# Patient Record
Sex: FEMALE | Race: WHITE | Employment: FULL TIME | URBAN - METROPOLITAN AREA
[De-identification: names, ages, dates, MRNs, and addresses within clinical notes are randomized per-mention and may not be internally consistent; named-entity substitution may affect disease eponyms.]

---

## 2017-06-02 ENCOUNTER — HOSPITAL ENCOUNTER (OUTPATIENT)
Dept: OTHER | Age: 37
Discharge: HOME | End: 2017-06-02
Attending: PREVENTIVE MEDICINE | Admitting: PREVENTIVE MEDICINE

## 2017-06-04 LAB
NIL (NEGATIVE) SPOT CONTROL: 0
PANEL A SPOT COUNT: 0
PANEL B SPOT COUNT: 0
POSITIVE CONTROL SPOT COUNT: >20
TB CELL IMMUNE MEASURE: NEGATIVE

## 2017-08-07 ENCOUNTER — HOSPITAL ENCOUNTER (OUTPATIENT)
Dept: MAMMOGRAPHY | Age: 37
Discharge: HOME | End: 2017-08-08

## 2017-08-07 DIAGNOSIS — Z12.31 VISIT FOR SCREENING MAMMOGRAM: ICD-10-CM

## 2017-08-14 ENCOUNTER — HOSPITAL ENCOUNTER (OUTPATIENT)
Dept: ULTRASOUND IMAGING | Age: 37
Discharge: HOME | End: 2017-08-14
Attending: UROLOGY | Admitting: UROLOGY

## 2017-08-14 DIAGNOSIS — R39.15 URGENCY OF URINATION: ICD-10-CM

## 2017-08-28 ENCOUNTER — OFFICE VISIT (OUTPATIENT)
Dept: FAMILY MEDICINE CLINIC | Age: 37
End: 2017-08-28

## 2017-08-28 VITALS
HEIGHT: 63 IN | WEIGHT: 197 LBS | DIASTOLIC BLOOD PRESSURE: 78 MMHG | HEART RATE: 78 BPM | SYSTOLIC BLOOD PRESSURE: 142 MMHG | BODY MASS INDEX: 34.91 KG/M2 | OXYGEN SATURATION: 98 %

## 2017-08-28 DIAGNOSIS — Z23 NEED FOR INFLUENZA VACCINATION: ICD-10-CM

## 2017-08-28 DIAGNOSIS — Z23 NEED FOR PROPHYLACTIC VACCINATION AGAINST DIPHTHERIA-TETANUS-PERTUSSIS (DTP): ICD-10-CM

## 2017-08-28 DIAGNOSIS — I10 ESSENTIAL HYPERTENSION: Primary | ICD-10-CM

## 2017-08-28 DIAGNOSIS — E55.9 VITAMIN D DEFICIENCY: ICD-10-CM

## 2017-08-28 DIAGNOSIS — R63.5 WEIGHT GAIN: ICD-10-CM

## 2017-08-28 DIAGNOSIS — Z51.81 MEDICATION MONITORING ENCOUNTER: ICD-10-CM

## 2017-08-28 DIAGNOSIS — G43.109 MIGRAINE EQUIVALENT SYNDROME: ICD-10-CM

## 2017-08-28 PROCEDURE — 90686 IIV4 VACC NO PRSV 0.5 ML IM: CPT | Performed by: FAMILY MEDICINE

## 2017-08-28 PROCEDURE — 90715 TDAP VACCINE 7 YRS/> IM: CPT | Performed by: FAMILY MEDICINE

## 2017-08-28 PROCEDURE — 36415 COLL VENOUS BLD VENIPUNCTURE: CPT | Performed by: FAMILY MEDICINE

## 2017-08-28 PROCEDURE — 90471 IMMUNIZATION ADMIN: CPT | Performed by: FAMILY MEDICINE

## 2017-08-28 PROCEDURE — 90472 IMMUNIZATION ADMIN EACH ADD: CPT | Performed by: FAMILY MEDICINE

## 2017-08-28 PROCEDURE — 99214 OFFICE O/P EST MOD 30 MIN: CPT | Performed by: FAMILY MEDICINE

## 2017-08-28 RX ORDER — PHENTERMINE HYDROCHLORIDE 37.5 MG/1
37.5 CAPSULE ORAL EVERY MORNING
Qty: 30 CAPSULE | Refills: 0 | Status: SHIPPED | OUTPATIENT
Start: 2017-08-28 | End: 2017-09-25 | Stop reason: SDUPTHER

## 2017-08-28 RX ORDER — LORAZEPAM 0.5 MG/1
0.5 TABLET ORAL PRN
COMMUNITY
End: 2019-11-07 | Stop reason: SDUPTHER

## 2017-08-28 RX ORDER — SUMATRIPTAN 100 MG/1
100 TABLET, FILM COATED ORAL PRN
Qty: 9 TABLET | Refills: 5 | Status: SHIPPED | OUTPATIENT
Start: 2017-08-28 | End: 2019-03-12 | Stop reason: SDUPTHER

## 2017-08-28 RX ORDER — PROPRANOLOL HYDROCHLORIDE 60 MG/1
60 TABLET ORAL DAILY
COMMUNITY
End: 2017-09-25 | Stop reason: SDUPTHER

## 2017-08-28 RX ORDER — BUTALBITAL, ACETAMINOPHEN AND CAFFEINE 300; 40; 50 MG/1; MG/1; MG/1
1 CAPSULE ORAL PRN
COMMUNITY
End: 2017-08-28 | Stop reason: SDUPTHER

## 2017-08-28 RX ORDER — SUMATRIPTAN 100 MG/1
100 TABLET, FILM COATED ORAL PRN
COMMUNITY
End: 2017-08-28 | Stop reason: SDUPTHER

## 2017-08-28 RX ORDER — BUTALBITAL, ACETAMINOPHEN AND CAFFEINE 300; 40; 50 MG/1; MG/1; MG/1
1 CAPSULE ORAL PRN
Qty: 30 CAPSULE | Refills: 0 | Status: SHIPPED | OUTPATIENT
Start: 2017-08-28 | End: 2017-08-30 | Stop reason: SDUPTHER

## 2017-08-28 ASSESSMENT — PATIENT HEALTH QUESTIONNAIRE - PHQ9
SUM OF ALL RESPONSES TO PHQ QUESTIONS 1-9: 0
1. LITTLE INTEREST OR PLEASURE IN DOING THINGS: 0
2. FEELING DOWN, DEPRESSED OR HOPELESS: 0
SUM OF ALL RESPONSES TO PHQ9 QUESTIONS 1 & 2: 0

## 2017-08-28 ASSESSMENT — ENCOUNTER SYMPTOMS
BLURRED VISION: 0
ABDOMINAL PAIN: 0
COUGH: 0

## 2017-08-29 LAB
A/G RATIO: 1.8 (ref 1.1–2.2)
ALBUMIN SERPL-MCNC: 4.7 G/DL (ref 3.4–5)
ALP BLD-CCNC: 100 U/L (ref 40–129)
ALT SERPL-CCNC: 25 U/L (ref 10–40)
ANION GAP SERPL CALCULATED.3IONS-SCNC: 17 MMOL/L (ref 3–16)
AST SERPL-CCNC: 18 U/L (ref 15–37)
BILIRUB SERPL-MCNC: 0.4 MG/DL (ref 0–1)
BUN BLDV-MCNC: 15 MG/DL (ref 7–20)
CALCIUM SERPL-MCNC: 9.7 MG/DL (ref 8.3–10.6)
CHLORIDE BLD-SCNC: 102 MMOL/L (ref 99–110)
CHOLESTEROL, FASTING: 201 MG/DL (ref 0–199)
CO2: 22 MMOL/L (ref 21–32)
CREAT SERPL-MCNC: 0.7 MG/DL (ref 0.6–1.1)
GFR AFRICAN AMERICAN: >60
GFR NON-AFRICAN AMERICAN: >60
GLOBULIN: 2.6 G/DL
GLUCOSE FASTING: 91 MG/DL (ref 70–99)
HCT VFR BLD CALC: 39.8 % (ref 36–48)
HDLC SERPL-MCNC: 48 MG/DL (ref 40–60)
HEMOGLOBIN: 13.7 G/DL (ref 12–16)
LDL CHOLESTEROL CALCULATED: 124 MG/DL
MCH RBC QN AUTO: 29.7 PG (ref 26–34)
MCHC RBC AUTO-ENTMCNC: 34.3 G/DL (ref 31–36)
MCV RBC AUTO: 86.6 FL (ref 80–100)
PDW BLD-RTO: 13 % (ref 12.4–15.4)
PLATELET # BLD: 376 K/UL (ref 135–450)
PMV BLD AUTO: 8.4 FL (ref 5–10.5)
POTASSIUM SERPL-SCNC: 3.9 MMOL/L (ref 3.5–5.1)
RBC # BLD: 4.6 M/UL (ref 4–5.2)
SODIUM BLD-SCNC: 141 MMOL/L (ref 136–145)
TOTAL PROTEIN: 7.3 G/DL (ref 6.4–8.2)
TRIGLYCERIDE, FASTING: 147 MG/DL (ref 0–150)
TSH SERPL DL<=0.05 MIU/L-ACNC: 1.29 UIU/ML (ref 0.27–4.2)
VITAMIN D 25-HYDROXY: 34.7 NG/ML
VLDLC SERPL CALC-MCNC: 29 MG/DL
WBC # BLD: 9.2 K/UL (ref 4–11)

## 2017-08-30 ENCOUNTER — TELEPHONE (OUTPATIENT)
Dept: FAMILY MEDICINE CLINIC | Age: 37
End: 2017-08-30

## 2017-08-30 DIAGNOSIS — G43.109 MIGRAINE EQUIVALENT SYNDROME: ICD-10-CM

## 2017-08-30 RX ORDER — BUTALBITAL, ACETAMINOPHEN AND CAFFEINE 300; 40; 50 MG/1; MG/1; MG/1
1 CAPSULE ORAL EVERY 6 HOURS PRN
Qty: 30 CAPSULE | Refills: 0 | Status: SHIPPED | OUTPATIENT
Start: 2017-08-30 | End: 2018-07-11

## 2017-09-25 ENCOUNTER — OFFICE VISIT (OUTPATIENT)
Dept: FAMILY MEDICINE CLINIC | Age: 37
End: 2017-09-25

## 2017-09-25 VITALS
HEART RATE: 88 BPM | BODY MASS INDEX: 33.66 KG/M2 | WEIGHT: 190 LBS | DIASTOLIC BLOOD PRESSURE: 86 MMHG | HEIGHT: 63 IN | OXYGEN SATURATION: 98 % | SYSTOLIC BLOOD PRESSURE: 116 MMHG

## 2017-09-25 DIAGNOSIS — R63.5 WEIGHT GAIN: ICD-10-CM

## 2017-09-25 DIAGNOSIS — I10 ESSENTIAL HYPERTENSION: Primary | ICD-10-CM

## 2017-09-25 PROCEDURE — 99213 OFFICE O/P EST LOW 20 MIN: CPT | Performed by: FAMILY MEDICINE

## 2017-09-25 RX ORDER — PHENTERMINE HYDROCHLORIDE 37.5 MG/1
37.5 CAPSULE ORAL EVERY MORNING
Qty: 30 CAPSULE | Refills: 0 | Status: SHIPPED | OUTPATIENT
Start: 2017-09-25 | End: 2017-10-23 | Stop reason: SDUPTHER

## 2017-09-25 RX ORDER — PROPRANOLOL HYDROCHLORIDE 60 MG/1
60 TABLET ORAL DAILY
Qty: 30 TABLET | Refills: 5 | Status: SHIPPED | OUTPATIENT
Start: 2017-09-25 | End: 2018-04-23 | Stop reason: SDUPTHER

## 2017-09-26 ASSESSMENT — ENCOUNTER SYMPTOMS
COUGH: 0
ABDOMINAL PAIN: 0
BLURRED VISION: 0

## 2017-10-23 ENCOUNTER — OFFICE VISIT (OUTPATIENT)
Dept: FAMILY MEDICINE CLINIC | Age: 37
End: 2017-10-23

## 2017-10-23 VITALS
BODY MASS INDEX: 32.89 KG/M2 | HEIGHT: 63 IN | HEART RATE: 118 BPM | SYSTOLIC BLOOD PRESSURE: 142 MMHG | WEIGHT: 185.6 LBS | DIASTOLIC BLOOD PRESSURE: 94 MMHG | OXYGEN SATURATION: 98 %

## 2017-10-23 DIAGNOSIS — R63.5 WEIGHT GAIN: ICD-10-CM

## 2017-10-23 DIAGNOSIS — G43.109 MIGRAINE EQUIVALENT SYNDROME: Primary | ICD-10-CM

## 2017-10-23 DIAGNOSIS — I10 ESSENTIAL HYPERTENSION: ICD-10-CM

## 2017-10-23 PROCEDURE — 99213 OFFICE O/P EST LOW 20 MIN: CPT | Performed by: FAMILY MEDICINE

## 2017-10-23 PROCEDURE — G8417 CALC BMI ABV UP PARAM F/U: HCPCS | Performed by: FAMILY MEDICINE

## 2017-10-23 PROCEDURE — G8484 FLU IMMUNIZE NO ADMIN: HCPCS | Performed by: FAMILY MEDICINE

## 2017-10-23 PROCEDURE — 1036F TOBACCO NON-USER: CPT | Performed by: FAMILY MEDICINE

## 2017-10-23 PROCEDURE — G8427 DOCREV CUR MEDS BY ELIG CLIN: HCPCS | Performed by: FAMILY MEDICINE

## 2017-10-23 RX ORDER — PHENTERMINE HYDROCHLORIDE 37.5 MG/1
37.5 CAPSULE ORAL EVERY MORNING
Qty: 30 CAPSULE | Refills: 0 | Status: SHIPPED | OUTPATIENT
Start: 2017-10-23 | End: 2017-11-22

## 2017-10-23 NOTE — PROGRESS NOTES
Chief Complaint   Patient presents with    Weight Loss     Patient is here for a recheck on weight    Hypertension     Patient is here for a recheck on hypertension        Patient is established unless otherwise noted. Above chief complaint and [place] obtained by this physician provider. Review of Systems   Constitutional: Positive for weight loss. Negative for fever and malaise/fatigue. HENT: Negative for congestion. Eyes: Negative for blurred vision. Respiratory: Negative for cough. Cardiovascular: Negative for chest pain and leg swelling. Gastrointestinal: Negative for abdominal pain. Musculoskeletal: Negative for myalgias. Skin: Negative for rash. Neurological: Negative for headaches. Psychiatric/Behavioral: Negative for depression. The patient is not nervous/anxious. Allergies   Allergen Reactions    Vicodin [Hydrocodone-Acetaminophen]      Makes patient nauseated.  Pcn [Penicillins] Other (See Comments)     Family allergy, never given    Sulfa Antibiotics Other (See Comments)     Family allergy, never given     Allergy history updated.     Outpatient Prescriptions Marked as Taking for the 10/23/17 encounter (Office Visit) with Will Mcintosh MD   Medication Sig Dispense Refill    phentermine (ADIPEX-P) 37.5 MG capsule Take 1 capsule by mouth every morning 30 capsule 0    propranolol (INDERAL) 60 MG tablet Take 1 tablet by mouth daily 30 tablet 5    butalbital-APAP-caffeine (FIORICET) -40 MG CAPS per capsule Take 1 capsule by mouth every 6 hours as needed for Headaches 30 capsule 0    LORazepam (ATIVAN) 0.5 MG tablet Take 0.5 mg by mouth as needed for Anxiety      SUMAtriptan (IMITREX) 100 MG tablet Take 1 tablet by mouth as needed for Migraine 9 tablet 5    Cholecalciferol (VITAMIN D3) 2000 UNITS CAPS Take 2 capsules by mouth      Coenzyme Q10 (CO Q 10) 100 MG CAPS Take by mouth      magnesium 30 MG tablet Take 30 mg by mouth 2 times daily      b complex

## 2017-10-29 ASSESSMENT — ENCOUNTER SYMPTOMS
COUGH: 0
ABDOMINAL PAIN: 0
BLURRED VISION: 0

## 2017-11-30 ENCOUNTER — TELEPHONE (OUTPATIENT)
Dept: FAMILY MEDICINE CLINIC | Age: 37
End: 2017-11-30

## 2017-11-30 DIAGNOSIS — L98.9 SKIN PROBLEM: Primary | ICD-10-CM

## 2017-11-30 NOTE — TELEPHONE ENCOUNTER
Patient called and would like a referral to Dermatology, states she has a spot on the bottom of her chin that hasnt went away. It has been visible since may. Please Advise. Will see Derm downstairs. Please Advise.

## 2018-02-15 ENCOUNTER — OFFICE VISIT (OUTPATIENT)
Dept: FAMILY MEDICINE CLINIC | Age: 38
End: 2018-02-15

## 2018-02-15 ENCOUNTER — HOSPITAL ENCOUNTER (OUTPATIENT)
Dept: GENERAL RADIOLOGY | Age: 38
Discharge: HOME | End: 2018-02-15
Attending: NURSE PRACTITIONER | Admitting: NURSE PRACTITIONER

## 2018-02-15 VITALS
HEART RATE: 88 BPM | HEIGHT: 63 IN | BODY MASS INDEX: 34.05 KG/M2 | TEMPERATURE: 98.4 F | OXYGEN SATURATION: 98 % | WEIGHT: 192.2 LBS | DIASTOLIC BLOOD PRESSURE: 74 MMHG | SYSTOLIC BLOOD PRESSURE: 102 MMHG

## 2018-02-15 DIAGNOSIS — R05.9 COUGH: ICD-10-CM

## 2018-02-15 DIAGNOSIS — R68.89 FLU-LIKE SYMPTOMS: Primary | ICD-10-CM

## 2018-02-15 LAB
INFLUENZA A ANTIBODY: NEGATIVE
INFLUENZA B ANTIBODY: NEGATIVE

## 2018-02-15 PROCEDURE — 99213 OFFICE O/P EST LOW 20 MIN: CPT | Performed by: NURSE PRACTITIONER

## 2018-02-15 PROCEDURE — 87804 INFLUENZA ASSAY W/OPTIC: CPT | Performed by: NURSE PRACTITIONER

## 2018-02-15 RX ORDER — GUAIFENESIN 600 MG/1
600 TABLET, EXTENDED RELEASE ORAL 2 TIMES DAILY
Qty: 30 TABLET | Refills: 0 | Status: SHIPPED | OUTPATIENT
Start: 2018-02-15 | End: 2019-07-17

## 2018-02-15 RX ORDER — BENZONATATE 100 MG/1
100 CAPSULE ORAL 3 TIMES DAILY PRN
Qty: 21 CAPSULE | Refills: 0 | Status: SHIPPED | OUTPATIENT
Start: 2018-02-15 | End: 2018-02-22

## 2018-02-15 RX ORDER — OSELTAMIVIR PHOSPHATE 75 MG/1
75 CAPSULE ORAL 2 TIMES DAILY
Qty: 10 CAPSULE | Refills: 0 | Status: SHIPPED | OUTPATIENT
Start: 2018-02-15 | End: 2018-02-20

## 2018-02-15 ASSESSMENT — ENCOUNTER SYMPTOMS
VOMITING: 0
FLU SYMPTOMS: 1
DIARRHEA: 0
SWOLLEN GLANDS: 1
SINUS PRESSURE: 1
SHORTNESS OF BREATH: 1
NAUSEA: 0
RHINORRHEA: 1
COUGH: 1
EYES NEGATIVE: 1
CHEST TIGHTNESS: 1

## 2018-02-15 NOTE — PATIENT INSTRUCTIONS
We are committed to providing you the best care possible. If you receive a survey after visiting one of our offices, please take time to share your experience concerning your physician office visit. These surveys are confidential and no health information about you is shared. We are eager to improve for you and we are counting on your feedback to help make that happen. Patient Education        Cough: Care Instructions  Your Care Instructions    A cough is your body's response to something that bothers your throat or airways. Many things can cause a cough. You might cough because of a cold or the flu, bronchitis, or asthma. Smoking, postnasal drip, allergies, and stomach acid that backs up into your throat also can cause coughs. A cough is a symptom, not a disease. Most coughs stop when the cause, such as a cold, goes away. You can take a few steps at home to cough less and feel better. Follow-up care is a key part of your treatment and safety. Be sure to make and go to all appointments, and call your doctor if you are having problems. It's also a good idea to know your test results and keep a list of the medicines you take. How can you care for yourself at home? · Drink lots of water and other fluids. This helps thin the mucus and soothes a dry or sore throat. Honey or lemon juice in hot water or tea may ease a dry cough. · Take cough medicine as directed by your doctor. · Prop up your head on pillows to help you breathe and ease a dry cough. · Try cough drops to soothe a dry or sore throat. Cough drops don't stop a cough. Medicine-flavored cough drops are no better than candy-flavored drops or hard candy. · Do not smoke. Avoid secondhand smoke. If you need help quitting, talk to your doctor about stop-smoking programs and medicines. These can increase your chances of quitting for good. When should you call for help? Call 911 anytime you think you may need emergency care.  For example, call information.

## 2018-02-15 NOTE — PROGRESS NOTES
capsules by mouth      Coenzyme Q10 (CO Q 10) 100 MG CAPS Take by mouth daily       magnesium 30 MG tablet Take 30 mg by mouth 2 times daily      b complex vitamins capsule Take 1 capsule by mouth daily       No current facility-administered medications on file prior to visit. Past Medical, Family, and Social History have been reviewed today. Review of Systems   Constitutional: Positive for activity change, appetite change (increased), chills, diaphoresis and fatigue. Fever: uncertain. HENT: Positive for congestion, rhinorrhea and sinus pressure (about 1 week). Eyes: Negative. Respiratory: Positive for cough, chest tightness and shortness of breath. Cardiovascular: Negative for chest pain and palpitations. Gastrointestinal: Negative for diarrhea, nausea and vomiting. Neurological: Positive for headaches (baseline degree). Negative for dizziness and light-headedness. OBJECTIVE:     /74   Pulse 88   Temp 98.4 °F (36.9 °C) (Oral)   Ht 5' 2.5\" (1.588 m)   Wt 192 lb 3.2 oz (87.2 kg)   SpO2 98%   BMI 34.59 kg/m²     Physical Exam   Constitutional: She is oriented to person, place, and time. She appears well-developed and well-nourished. HENT:   Head: Normocephalic. Right Ear: Tympanic membrane, external ear and ear canal normal.   Left Ear: Tympanic membrane, external ear and ear canal normal.   Nose: Nose normal. Right sinus exhibits no maxillary sinus tenderness and no frontal sinus tenderness. Left sinus exhibits no maxillary sinus tenderness and no frontal sinus tenderness. Mouth/Throat: Uvula is midline, oropharynx is clear and moist and mucous membranes are normal.   Eyes: Conjunctivae are normal. Pupils are equal, round, and reactive to light. Neck: Neck supple. Cardiovascular: Normal rate and regular rhythm. Pulmonary/Chest: Effort normal. No respiratory distress. She has no wheezes. She has no rales.    Rhonchi w/expiration to RIGOBERTO   Lymphadenopathy: guaiFENesin (MUCINEX) 600 MG extended release tablet; Take 1 tablet by mouth 2 times daily  Dispense: 30 tablet; Refill: 0  - benzonatate (TESSALON PERLES) 100 MG capsule; Take 1 capsule by mouth 3 times daily as needed for Cough  Dispense: 21 capsule; Refill: 0      Return if symptoms worsen or fail to improve. Care discussed with patient. Questions answered and patient verbalizes understanding and agrees with plan. Medications reviewed and reconciled. Continue current medications. Appropriate prescriptions are ordered. Risks and benefits of meds are discussed. After visit summary provided. Advised to call for any problems, questions, or concerns. Patient educated on signs and symptoms of exacerbation and when to seek further medical attention. Patient verbalized understanding of these signs and symptoms as well as the plan of care. Patient will be contacted in two to three days to check on progress.

## 2018-04-06 ENCOUNTER — PATIENT MESSAGE (OUTPATIENT)
Dept: FAMILY MEDICINE CLINIC | Age: 38
End: 2018-04-06

## 2018-04-06 DIAGNOSIS — R61 NIGHT SWEATS: Primary | ICD-10-CM

## 2018-04-09 RX ORDER — CLONIDINE HYDROCHLORIDE 0.1 MG/1
0.1 TABLET ORAL NIGHTLY
Qty: 30 TABLET | Refills: 2 | Status: SHIPPED | OUTPATIENT
Start: 2018-04-09 | End: 2019-01-16

## 2018-04-23 ENCOUNTER — OFFICE VISIT (OUTPATIENT)
Dept: FAMILY MEDICINE CLINIC | Age: 38
End: 2018-04-23

## 2018-04-23 VITALS
BODY MASS INDEX: 35.3 KG/M2 | DIASTOLIC BLOOD PRESSURE: 82 MMHG | HEART RATE: 75 BPM | HEIGHT: 63 IN | OXYGEN SATURATION: 99 % | SYSTOLIC BLOOD PRESSURE: 128 MMHG | WEIGHT: 199.2 LBS

## 2018-04-23 DIAGNOSIS — I10 ESSENTIAL HYPERTENSION: Primary | ICD-10-CM

## 2018-04-23 DIAGNOSIS — R73.9 HYPERGLYCEMIA: ICD-10-CM

## 2018-04-23 DIAGNOSIS — G43.109 MIGRAINE EQUIVALENT SYNDROME: ICD-10-CM

## 2018-04-23 DIAGNOSIS — E66.9 OBESITY (BMI 35.0-39.9 WITHOUT COMORBIDITY): ICD-10-CM

## 2018-04-23 LAB
A/G RATIO: 1.9 (ref 1.1–2.2)
ALBUMIN SERPL-MCNC: 4.7 G/DL (ref 3.4–5)
ALP BLD-CCNC: 99 U/L (ref 40–129)
ALT SERPL-CCNC: 20 U/L (ref 10–40)
ANION GAP SERPL CALCULATED.3IONS-SCNC: 20 MMOL/L (ref 3–16)
AST SERPL-CCNC: 16 U/L (ref 15–37)
BILIRUB SERPL-MCNC: 0.3 MG/DL (ref 0–1)
BUN BLDV-MCNC: 16 MG/DL (ref 7–20)
CALCIUM SERPL-MCNC: 9.7 MG/DL (ref 8.3–10.6)
CHLORIDE BLD-SCNC: 103 MMOL/L (ref 99–110)
CHOLESTEROL, FASTING: 195 MG/DL (ref 0–199)
CO2: 23 MMOL/L (ref 21–32)
CREAT SERPL-MCNC: 0.7 MG/DL (ref 0.6–1.1)
GFR AFRICAN AMERICAN: >60
GFR NON-AFRICAN AMERICAN: >60
GLOBULIN: 2.5 G/DL
GLUCOSE FASTING: 96 MG/DL (ref 70–99)
HDLC SERPL-MCNC: 52 MG/DL (ref 40–60)
LDL CHOLESTEROL CALCULATED: 115 MG/DL
POTASSIUM SERPL-SCNC: 4.3 MMOL/L (ref 3.5–5.1)
SODIUM BLD-SCNC: 146 MMOL/L (ref 136–145)
TOTAL PROTEIN: 7.2 G/DL (ref 6.4–8.2)
TRIGLYCERIDE, FASTING: 142 MG/DL (ref 0–150)
VLDLC SERPL CALC-MCNC: 28 MG/DL

## 2018-04-23 PROCEDURE — 36415 COLL VENOUS BLD VENIPUNCTURE: CPT | Performed by: FAMILY MEDICINE

## 2018-04-23 PROCEDURE — 99214 OFFICE O/P EST MOD 30 MIN: CPT | Performed by: FAMILY MEDICINE

## 2018-04-23 RX ORDER — PROPRANOLOL HYDROCHLORIDE 60 MG/1
60 TABLET ORAL DAILY
Qty: 30 TABLET | Refills: 12 | Status: SHIPPED | OUTPATIENT
Start: 2018-04-23 | End: 2019-03-12

## 2018-04-23 RX ORDER — OMEPRAZOLE 20 MG/1
20 CAPSULE, DELAYED RELEASE ORAL DAILY
COMMUNITY
End: 2019-07-16 | Stop reason: SDUPTHER

## 2018-04-23 RX ORDER — MULTIVITAMIN WITH IRON
250 TABLET ORAL DAILY
COMMUNITY
End: 2019-03-12 | Stop reason: ALTCHOICE

## 2018-04-23 RX ORDER — GARLIC 180 MG
1 TABLET, DELAYED RELEASE (ENTERIC COATED) ORAL DAILY
COMMUNITY
End: 2022-10-12

## 2018-04-23 ASSESSMENT — ENCOUNTER SYMPTOMS
ABDOMINAL PAIN: 0
BLURRED VISION: 0
COUGH: 0

## 2018-04-24 LAB
ESTIMATED AVERAGE GLUCOSE: 119.8 MG/DL
HBA1C MFR BLD: 5.8 %

## 2018-05-02 ENCOUNTER — OFFICE VISIT (OUTPATIENT)
Dept: BARIATRICS/WEIGHT MGMT | Age: 38
End: 2018-05-02

## 2018-05-02 VITALS
BODY MASS INDEX: 36.11 KG/M2 | HEIGHT: 63 IN | DIASTOLIC BLOOD PRESSURE: 58 MMHG | SYSTOLIC BLOOD PRESSURE: 128 MMHG | WEIGHT: 203.8 LBS | HEART RATE: 67 BPM

## 2018-05-02 DIAGNOSIS — E66.9 OBESITY (BMI 35.0-39.9 WITHOUT COMORBIDITY): Primary | ICD-10-CM

## 2018-05-02 PROCEDURE — 99999 PR OFFICE/OUTPT VISIT,PROCEDURE ONLY: CPT

## 2018-05-11 ENCOUNTER — OFFICE VISIT (OUTPATIENT)
Dept: BARIATRICS/WEIGHT MGMT | Age: 38
End: 2018-05-11

## 2018-05-11 VITALS
SYSTOLIC BLOOD PRESSURE: 133 MMHG | DIASTOLIC BLOOD PRESSURE: 81 MMHG | HEART RATE: 75 BPM | BODY MASS INDEX: 35.45 KG/M2 | WEIGHT: 200.1 LBS | HEIGHT: 63 IN

## 2018-05-11 PROCEDURE — 99999 PR OFFICE/OUTPT VISIT,PROCEDURE ONLY: CPT

## 2018-05-18 ENCOUNTER — OFFICE VISIT (OUTPATIENT)
Dept: BARIATRICS/WEIGHT MGMT | Age: 38
End: 2018-05-18

## 2018-05-18 VITALS
WEIGHT: 197.6 LBS | DIASTOLIC BLOOD PRESSURE: 84 MMHG | BODY MASS INDEX: 35.01 KG/M2 | SYSTOLIC BLOOD PRESSURE: 130 MMHG | HEART RATE: 80 BPM | HEIGHT: 63 IN

## 2018-05-18 DIAGNOSIS — E66.9 OBESITY (BMI 35.0-39.9 WITHOUT COMORBIDITY): Primary | ICD-10-CM

## 2018-05-18 PROCEDURE — 99999 PR OFFICE/OUTPT VISIT,PROCEDURE ONLY: CPT

## 2018-05-23 ENCOUNTER — OFFICE VISIT (OUTPATIENT)
Dept: BARIATRICS/WEIGHT MGMT | Age: 38
End: 2018-05-23

## 2018-05-23 VITALS
HEART RATE: 70 BPM | SYSTOLIC BLOOD PRESSURE: 114 MMHG | HEIGHT: 63 IN | DIASTOLIC BLOOD PRESSURE: 66 MMHG | WEIGHT: 197.6 LBS | BODY MASS INDEX: 35.01 KG/M2

## 2018-05-23 DIAGNOSIS — E66.9 OBESITY (BMI 35.0-39.9 WITHOUT COMORBIDITY): Primary | ICD-10-CM

## 2018-05-23 PROCEDURE — 99999 PR OFFICE/OUTPT VISIT,PROCEDURE ONLY: CPT

## 2018-06-01 ENCOUNTER — OFFICE VISIT (OUTPATIENT)
Dept: BARIATRICS/WEIGHT MGMT | Age: 38
End: 2018-06-01

## 2018-06-01 VITALS
WEIGHT: 196.8 LBS | HEIGHT: 63 IN | SYSTOLIC BLOOD PRESSURE: 121 MMHG | DIASTOLIC BLOOD PRESSURE: 61 MMHG | BODY MASS INDEX: 34.87 KG/M2 | HEART RATE: 69 BPM

## 2018-06-01 DIAGNOSIS — E66.9 OBESITY (BMI 30.0-34.9): Primary | ICD-10-CM

## 2018-06-01 PROCEDURE — 99999 PR OFFICE/OUTPT VISIT,PROCEDURE ONLY: CPT

## 2018-06-15 ENCOUNTER — OFFICE VISIT (OUTPATIENT)
Dept: FAMILY MEDICINE CLINIC | Age: 38
End: 2018-06-15

## 2018-06-15 VITALS
WEIGHT: 198.8 LBS | HEART RATE: 76 BPM | OXYGEN SATURATION: 98 % | BODY MASS INDEX: 35.22 KG/M2 | SYSTOLIC BLOOD PRESSURE: 134 MMHG | DIASTOLIC BLOOD PRESSURE: 98 MMHG

## 2018-06-15 DIAGNOSIS — G56.01 CARPAL TUNNEL SYNDROME OF RIGHT WRIST: Primary | ICD-10-CM

## 2018-06-15 PROCEDURE — 99213 OFFICE O/P EST LOW 20 MIN: CPT | Performed by: FAMILY MEDICINE

## 2018-06-29 ENCOUNTER — OFFICE VISIT (OUTPATIENT)
Dept: BARIATRICS/WEIGHT MGMT | Age: 38
End: 2018-06-29

## 2018-06-29 VITALS
WEIGHT: 196.6 LBS | HEART RATE: 65 BPM | HEIGHT: 63 IN | SYSTOLIC BLOOD PRESSURE: 137 MMHG | BODY MASS INDEX: 34.84 KG/M2 | DIASTOLIC BLOOD PRESSURE: 74 MMHG

## 2018-06-29 DIAGNOSIS — E66.9 OBESITY (BMI 30.0-34.9): Primary | ICD-10-CM

## 2018-06-29 PROCEDURE — 99999 PR OFFICE/OUTPT VISIT,PROCEDURE ONLY: CPT

## 2018-06-29 ASSESSMENT — ENCOUNTER SYMPTOMS
COUGH: 0
ABDOMINAL PAIN: 0
BLURRED VISION: 0

## 2018-07-10 DIAGNOSIS — G43.109 MIGRAINE EQUIVALENT SYNDROME: ICD-10-CM

## 2018-07-11 RX ORDER — BUTALBITAL, ACETAMINOPHEN AND CAFFEINE 300; 40; 50 MG/1; MG/1; MG/1
CAPSULE ORAL
Qty: 30 CAPSULE | Refills: 0 | Status: SHIPPED | OUTPATIENT
Start: 2018-07-11 | End: 2019-10-31 | Stop reason: SDUPTHER

## 2018-07-23 ENCOUNTER — OFFICE VISIT (OUTPATIENT)
Dept: BARIATRICS/WEIGHT MGMT | Age: 38
End: 2018-07-23

## 2018-07-23 VITALS
DIASTOLIC BLOOD PRESSURE: 77 MMHG | HEART RATE: 58 BPM | BODY MASS INDEX: 34.91 KG/M2 | HEIGHT: 63 IN | SYSTOLIC BLOOD PRESSURE: 127 MMHG | WEIGHT: 197 LBS

## 2018-07-23 DIAGNOSIS — E66.9 OBESITY (BMI 30.0-34.9): Primary | ICD-10-CM

## 2018-07-23 PROCEDURE — 99999 PR OFFICE/OUTPT VISIT,PROCEDURE ONLY: CPT

## 2018-07-23 NOTE — PROGRESS NOTES
Outpatient Nutrition Counseling - Non-Surgical Weight Loss Program    REASON FOR VISIT: Week 9 Weigh-In    Chief Complaint:    Chief Complaint   Patient presents with    Weight Management       SUBJECTIVE:    The patient is a 34 y.o. female being seen for obesity, enrolled in Non-Surgical Weight Loss Program; [place], Height: 5' 3\" (160 cm), Weight: 197 lb (89.4 kg), Current Body mass index is 34.9 kg/m². The patient's PCP is Mariano Gutierrez MD     Comorbid Conditions:  Significant diseases affecting this patient are   Past Medical History:   Diagnosis Date    Hypertension     Migraines    . Review of Systems - ROS  Otherwise per HPI. Allergies: Allergies   Allergen Reactions    Vicodin [Hydrocodone-Acetaminophen]      Makes patient nauseated.  Pcn [Penicillins] Other (See Comments)     Family allergy, never given    Sulfa Antibiotics Other (See Comments)     Family allergy, never given       Past Surgical History:  Past Surgical History:   Procedure Laterality Date    CHOLECYSTECTOMY  20014    HERNIA REPAIR      HYSTERECTOMY      NASAL SINUS SURGERY  1991       Family History:  No family history on file. Social History:  Social History     Social History    Marital status:      Spouse name: N/A    Number of children: N/A    Years of education: N/A     Occupational History    Not on file.      Social History Main Topics    Smoking status: Former Smoker     Types: Cigarettes     Quit date: 2009    Smokeless tobacco: Never Used    Alcohol use 0.0 oz/week      Comment: on occasion    Drug use: No    Sexual activity: Not on file     Other Topics Concern    Not on file     Social History Narrative    No narrative on file         OBJECTIVE:  Physical Exam   /77 (Site: Right Arm, Position: Sitting, Cuff Size: Large Adult)   Pulse 58   Ht 5' 3\" (1.6 m)   Wt 197 lb (89.4 kg)   BMI 34.90 kg/m²      Weight Loss: +0.4 lbs this week, -6.8 lbs overall    NUTRITION DIAGNOSIS:

## 2018-08-06 ENCOUNTER — OFFICE VISIT (OUTPATIENT)
Dept: BARIATRICS/WEIGHT MGMT | Age: 38
End: 2018-08-06

## 2018-08-06 VITALS
BODY MASS INDEX: 34.85 KG/M2 | WEIGHT: 196.7 LBS | HEIGHT: 63 IN | DIASTOLIC BLOOD PRESSURE: 62 MMHG | SYSTOLIC BLOOD PRESSURE: 139 MMHG | HEART RATE: 73 BPM

## 2018-08-06 VITALS
DIASTOLIC BLOOD PRESSURE: 62 MMHG | HEART RATE: 73 BPM | WEIGHT: 196.7 LBS | SYSTOLIC BLOOD PRESSURE: 139 MMHG | HEIGHT: 63 IN | BODY MASS INDEX: 34.85 KG/M2

## 2018-08-06 DIAGNOSIS — E66.9 OBESITY (BMI 30.0-34.9): Primary | ICD-10-CM

## 2018-08-06 PROCEDURE — 99999 PR OFFICE/OUTPT VISIT,PROCEDURE ONLY: CPT

## 2018-08-06 NOTE — PROGRESS NOTES
tobacco: Never Used    Alcohol use 0.0 oz/week      Comment: on occasion    Drug use: No    Sexual activity: Not on file     Other Topics Concern    Not on file     Social History Narrative    No narrative on file         OBJECTIVE:  Physical Exam   /62 (Site: Right Arm, Position: Sitting, Cuff Size: Large Adult)   Pulse 73   Ht 5' 3\" (1.6 m)   Wt 196 lb 11.2 oz (89.2 kg)   BMI 34.84 kg/m²      Weight Loss: -7.1 lbs overall    NUTRITION DIAGNOSIS: Overweight / Obesity   Problem: Increased adiposity compared to reference standard or established norm   Etiology: Excess intake compared to output over time   S/S: ht: 63\" Wt: 196.7 lbs BMI: 34.8    NUTRITION INTERVENTIONS:    Individualized treatment goals to address nutrition diagnosis:   Instructed on calorie counting - 1200 kcals daily   Provided sample menus, and food lists   Encouraged Physical activity as approved by physician    MONITORING/ EVALUATION/ PLAN:   Pt verbalized understanding of all materials covered   Pt asked pertinent questions throughout the session - expect compliance with nutrition guidelines presented   Provided pt with contact information should questions arise prior to next visit   Will f/u with pt weekly  Micah Funes MS, RDN, LD  8/6/2018

## 2018-08-13 ENCOUNTER — OFFICE VISIT (OUTPATIENT)
Dept: BARIATRICS/WEIGHT MGMT | Age: 38
End: 2018-08-13

## 2018-08-13 VITALS
WEIGHT: 197.3 LBS | DIASTOLIC BLOOD PRESSURE: 64 MMHG | HEART RATE: 65 BPM | SYSTOLIC BLOOD PRESSURE: 124 MMHG | HEIGHT: 63 IN | BODY MASS INDEX: 34.96 KG/M2

## 2018-08-13 DIAGNOSIS — E66.9 OBESITY (BMI 30.0-34.9): Primary | ICD-10-CM

## 2018-08-13 PROCEDURE — 99999 PR OFFICE/OUTPT VISIT,PROCEDURE ONLY: CPT

## 2018-11-09 ENCOUNTER — HOSPITAL ENCOUNTER (OUTPATIENT)
Dept: MAMMOGRAPHY | Age: 38
Discharge: HOME | End: 2018-11-09
Payer: COMMERCIAL

## 2018-11-09 DIAGNOSIS — Z12.31 VISIT FOR SCREENING MAMMOGRAM: ICD-10-CM

## 2018-11-09 PROCEDURE — 77063 BREAST TOMOSYNTHESIS BI: CPT

## 2018-11-13 ENCOUNTER — HOSPITAL ENCOUNTER (OUTPATIENT)
Dept: ULTRASOUND IMAGING | Age: 38
Discharge: HOME | End: 2018-11-13
Payer: COMMERCIAL

## 2018-11-13 DIAGNOSIS — R92.8 ABNORMAL MAMMOGRAM: ICD-10-CM

## 2018-11-13 PROCEDURE — 76882 US LMTD JT/FCL EVL NVASC XTR: CPT

## 2018-11-14 ENCOUNTER — OFFICE VISIT (OUTPATIENT)
Dept: FAMILY MEDICINE CLINIC | Age: 38
End: 2018-11-14
Payer: COMMERCIAL

## 2018-11-14 VITALS
BODY MASS INDEX: 35.14 KG/M2 | DIASTOLIC BLOOD PRESSURE: 88 MMHG | TEMPERATURE: 98.3 F | WEIGHT: 198.4 LBS | SYSTOLIC BLOOD PRESSURE: 130 MMHG | OXYGEN SATURATION: 98 % | HEART RATE: 72 BPM

## 2018-11-14 DIAGNOSIS — J11.1 INFLUENZA-LIKE ILLNESS: ICD-10-CM

## 2018-11-14 DIAGNOSIS — J10.1 INFLUENZA A: Primary | ICD-10-CM

## 2018-11-14 LAB
INFLUENZA VIRUS A RNA: POSITIVE
INFLUENZA VIRUS B RNA: NEGATIVE

## 2018-11-14 PROCEDURE — 87502 INFLUENZA DNA AMP PROBE: CPT | Performed by: FAMILY MEDICINE

## 2018-11-14 PROCEDURE — 99213 OFFICE O/P EST LOW 20 MIN: CPT | Performed by: FAMILY MEDICINE

## 2018-11-14 RX ORDER — OSELTAMIVIR PHOSPHATE 75 MG/1
75 CAPSULE ORAL 2 TIMES DAILY
Qty: 10 CAPSULE | Refills: 0 | Status: SHIPPED | OUTPATIENT
Start: 2018-11-14 | End: 2018-11-19

## 2018-11-14 ASSESSMENT — PATIENT HEALTH QUESTIONNAIRE - PHQ9
2. FEELING DOWN, DEPRESSED OR HOPELESS: 0
1. LITTLE INTEREST OR PLEASURE IN DOING THINGS: 0
SUM OF ALL RESPONSES TO PHQ9 QUESTIONS 1 & 2: 0
SUM OF ALL RESPONSES TO PHQ QUESTIONS 1-9: 0
SUM OF ALL RESPONSES TO PHQ QUESTIONS 1-9: 0

## 2018-11-25 ASSESSMENT — ENCOUNTER SYMPTOMS
BACK PAIN: 0
SORE THROAT: 1
ABDOMINAL PAIN: 0
SINUS PRESSURE: 1
COUGH: 1
SHORTNESS OF BREATH: 0
CHEST TIGHTNESS: 0
NAUSEA: 0
DIARRHEA: 0
WHEEZING: 0

## 2019-01-16 ENCOUNTER — OFFICE VISIT (OUTPATIENT)
Dept: FAMILY MEDICINE CLINIC | Age: 39
End: 2019-01-16
Payer: COMMERCIAL

## 2019-01-16 VITALS
DIASTOLIC BLOOD PRESSURE: 82 MMHG | BODY MASS INDEX: 35.75 KG/M2 | HEART RATE: 85 BPM | HEIGHT: 63 IN | OXYGEN SATURATION: 98 % | TEMPERATURE: 97.4 F | WEIGHT: 201.8 LBS | SYSTOLIC BLOOD PRESSURE: 122 MMHG

## 2019-01-16 DIAGNOSIS — R68.89 FLU-LIKE SYMPTOMS: Primary | ICD-10-CM

## 2019-01-16 LAB
INFLUENZA A ANTIBODY: NEGATIVE
INFLUENZA B ANTIBODY: NEGATIVE

## 2019-01-16 PROCEDURE — 87804 INFLUENZA ASSAY W/OPTIC: CPT | Performed by: NURSE PRACTITIONER

## 2019-01-16 PROCEDURE — 99213 OFFICE O/P EST LOW 20 MIN: CPT | Performed by: NURSE PRACTITIONER

## 2019-01-16 ASSESSMENT — ENCOUNTER SYMPTOMS
FLU SYMPTOMS: 1
SORE THROAT: 0
RHINORRHEA: 0
SHORTNESS OF BREATH: 0
SINUS PAIN: 0
SINUS PRESSURE: 0
VISUAL CHANGE: 0
COUGH: 1
CHEST TIGHTNESS: 0
WHEEZING: 0

## 2019-03-12 ENCOUNTER — OFFICE VISIT (OUTPATIENT)
Dept: FAMILY MEDICINE CLINIC | Age: 39
End: 2019-03-12
Payer: COMMERCIAL

## 2019-03-12 VITALS
OXYGEN SATURATION: 98 % | BODY MASS INDEX: 35.89 KG/M2 | DIASTOLIC BLOOD PRESSURE: 82 MMHG | SYSTOLIC BLOOD PRESSURE: 130 MMHG | WEIGHT: 201 LBS | HEART RATE: 83 BPM

## 2019-03-12 DIAGNOSIS — E53.8 B12 DEFICIENCY: ICD-10-CM

## 2019-03-12 DIAGNOSIS — R73.9 HYPERGLYCEMIA: Primary | ICD-10-CM

## 2019-03-12 DIAGNOSIS — G43.109 MIGRAINE EQUIVALENT SYNDROME: ICD-10-CM

## 2019-03-12 DIAGNOSIS — R53.83 FATIGUE, UNSPECIFIED TYPE: ICD-10-CM

## 2019-03-12 DIAGNOSIS — I10 ESSENTIAL HYPERTENSION: ICD-10-CM

## 2019-03-12 LAB — HBA1C MFR BLD: 5.2 %

## 2019-03-12 PROCEDURE — 99214 OFFICE O/P EST MOD 30 MIN: CPT | Performed by: FAMILY MEDICINE

## 2019-03-12 PROCEDURE — 83036 HEMOGLOBIN GLYCOSYLATED A1C: CPT | Performed by: FAMILY MEDICINE

## 2019-03-12 RX ORDER — PROPRANOLOL HYDROCHLORIDE 60 MG/1
60 TABLET ORAL DAILY
Qty: 30 TABLET | Refills: 12 | Status: CANCELLED | OUTPATIENT
Start: 2019-03-12

## 2019-03-12 RX ORDER — SUMATRIPTAN 100 MG/1
100 TABLET, FILM COATED ORAL PRN
Qty: 9 TABLET | Refills: 5 | Status: SHIPPED | OUTPATIENT
Start: 2019-03-12

## 2019-03-12 ASSESSMENT — PATIENT HEALTH QUESTIONNAIRE - PHQ9
2. FEELING DOWN, DEPRESSED OR HOPELESS: 1
1. LITTLE INTEREST OR PLEASURE IN DOING THINGS: 1
SUM OF ALL RESPONSES TO PHQ QUESTIONS 1-9: 2
SUM OF ALL RESPONSES TO PHQ QUESTIONS 1-9: 2
SUM OF ALL RESPONSES TO PHQ9 QUESTIONS 1 & 2: 2

## 2019-03-19 ENCOUNTER — NURSE ONLY (OUTPATIENT)
Dept: FAMILY MEDICINE CLINIC | Age: 39
End: 2019-03-19
Payer: COMMERCIAL

## 2019-03-19 DIAGNOSIS — E53.8 B12 DEFICIENCY: ICD-10-CM

## 2019-03-19 DIAGNOSIS — I10 ESSENTIAL HYPERTENSION: ICD-10-CM

## 2019-03-19 DIAGNOSIS — R53.83 FATIGUE, UNSPECIFIED TYPE: ICD-10-CM

## 2019-03-19 LAB
A/G RATIO: 1.7 (ref 1.1–2.2)
ALBUMIN SERPL-MCNC: 4.3 G/DL (ref 3.4–5)
ALP BLD-CCNC: 93 U/L (ref 40–129)
ALT SERPL-CCNC: 23 U/L (ref 10–40)
ANION GAP SERPL CALCULATED.3IONS-SCNC: 13 MMOL/L (ref 3–16)
AST SERPL-CCNC: 16 U/L (ref 15–37)
BILIRUB SERPL-MCNC: <0.2 MG/DL (ref 0–1)
BUN BLDV-MCNC: 11 MG/DL (ref 7–20)
CALCIUM SERPL-MCNC: 9.4 MG/DL (ref 8.3–10.6)
CHLORIDE BLD-SCNC: 103 MMOL/L (ref 99–110)
CHOLESTEROL, FASTING: 165 MG/DL (ref 0–199)
CO2: 26 MMOL/L (ref 21–32)
CREAT SERPL-MCNC: 0.8 MG/DL (ref 0.6–1.1)
FOLATE: 17.66 NG/ML (ref 4.78–24.2)
GFR AFRICAN AMERICAN: >60
GFR NON-AFRICAN AMERICAN: >60
GLOBULIN: 2.6 G/DL
GLUCOSE FASTING: 105 MG/DL (ref 70–99)
HCT VFR BLD CALC: 38.8 % (ref 36–48)
HDLC SERPL-MCNC: 39 MG/DL (ref 40–60)
HEMOGLOBIN: 13.1 G/DL (ref 12–16)
LDL CHOLESTEROL CALCULATED: 109 MG/DL
MCH RBC QN AUTO: 29.1 PG (ref 26–34)
MCHC RBC AUTO-ENTMCNC: 33.8 G/DL (ref 31–36)
MCV RBC AUTO: 86.1 FL (ref 80–100)
PDW BLD-RTO: 13.7 % (ref 12.4–15.4)
PLATELET # BLD: 321 K/UL (ref 135–450)
PMV BLD AUTO: 8 FL (ref 5–10.5)
POTASSIUM SERPL-SCNC: 4 MMOL/L (ref 3.5–5.1)
RBC # BLD: 4.51 M/UL (ref 4–5.2)
SODIUM BLD-SCNC: 142 MMOL/L (ref 136–145)
T4 FREE: 1.4 NG/DL (ref 0.9–1.8)
TOTAL PROTEIN: 6.9 G/DL (ref 6.4–8.2)
TRIGLYCERIDE, FASTING: 86 MG/DL (ref 0–150)
TSH SERPL DL<=0.05 MIU/L-ACNC: 0.89 UIU/ML (ref 0.27–4.2)
VITAMIN B-12: 1002 PG/ML (ref 211–911)
VITAMIN D 25-HYDROXY: 29.7 NG/ML
VLDLC SERPL CALC-MCNC: 17 MG/DL
WBC # BLD: 5.8 K/UL (ref 4–11)

## 2019-03-19 PROCEDURE — 36415 COLL VENOUS BLD VENIPUNCTURE: CPT | Performed by: FAMILY MEDICINE

## 2019-03-31 ASSESSMENT — ENCOUNTER SYMPTOMS
BACK PAIN: 0
COUGH: 0
WHEEZING: 0
ABDOMINAL PAIN: 0
CHEST TIGHTNESS: 0
SHORTNESS OF BREATH: 0

## 2019-04-15 ENCOUNTER — EMPLOYEE WELLNESS (OUTPATIENT)
Dept: OTHER | Age: 39
End: 2019-04-15

## 2019-04-15 LAB
CHOLESTEROL: 166 MG/DL
GLUCOSE BLD-MCNC: 87 MG/DL (ref 70–99)
HDLC SERPL-MCNC: 47 MG/DL
LDL CHOLESTEROL CALCULATED: 100 MG/DL
PATIENT FASTING?: YES
TRIGL SERPL-MCNC: 97 MG/DL

## 2019-04-17 ENCOUNTER — TELEPHONE (OUTPATIENT)
Dept: FAMILY MEDICINE CLINIC | Age: 39
End: 2019-04-17

## 2019-04-23 VITALS — WEIGHT: 196 LBS | BODY MASS INDEX: 35 KG/M2

## 2019-04-25 ENCOUNTER — OFFICE VISIT (OUTPATIENT)
Dept: FAMILY MEDICINE CLINIC | Age: 39
End: 2019-04-25
Payer: COMMERCIAL

## 2019-04-25 VITALS
DIASTOLIC BLOOD PRESSURE: 78 MMHG | WEIGHT: 191.6 LBS | BODY MASS INDEX: 34.21 KG/M2 | OXYGEN SATURATION: 99 % | HEART RATE: 99 BPM | SYSTOLIC BLOOD PRESSURE: 114 MMHG

## 2019-04-25 DIAGNOSIS — R19.7 DIARRHEA, UNSPECIFIED TYPE: ICD-10-CM

## 2019-04-25 DIAGNOSIS — R10.31 RLQ ABDOMINAL PAIN: Primary | ICD-10-CM

## 2019-04-25 PROCEDURE — 99213 OFFICE O/P EST LOW 20 MIN: CPT | Performed by: FAMILY MEDICINE

## 2019-04-25 PROCEDURE — 36415 COLL VENOUS BLD VENIPUNCTURE: CPT | Performed by: FAMILY MEDICINE

## 2019-04-25 NOTE — LETTER
[address]  [address] [address] Beni Cisneros  Phone: [phone]  Fax: [phone]    Neil Miramontes MD        April 25, 2019     Patient: Minette Scheuermann   YOB: 1980   Date of Visit: 4/25/2019       To Whom It May Concern: It is my medical opinion that Minette Scheuermann should be excused for work absence today and yesterday due to illness. She was seen in the office today. If you have any questions or concerns, please don't hesitate to call.     Sincerely,        eNil Miramontes MD

## 2019-04-25 NOTE — PROGRESS NOTES
Chief Complaint   Patient presents with    Abdominal Pain     Patient states having abdominal pain with diarrhea       [place] NARRATIVE:    Belching no nausea or vomiting, clear liquids today. Abdominal distension and RLQ abd pain in come and go. Loose stools. No recent antibiotics nor ill contacts. Not better with immodium. Not related to the victoza. Patient is established unless otherwise noted. Above chief complaint and [place] obtained by this physician provider. Review of Systems   Constitutional: Negative for activity change, chills, fatigue and fever. HENT: Negative for congestion. Respiratory: Negative for cough, chest tightness, shortness of breath and wheezing. Cardiovascular: Negative for chest pain and leg swelling. Gastrointestinal: Positive for abdominal distention (and belching) and diarrhea. Negative for abdominal pain (bloating and discomfort) and nausea. Musculoskeletal: Negative for back pain and myalgias. Skin: Negative for rash. Psychiatric/Behavioral: Negative for behavioral problems and dysphoric mood. Allergies   Allergen Reactions    Vicodin [Hydrocodone-Acetaminophen]      Makes patient nauseated.  Pcn [Penicillins] Other (See Comments)     Family allergy, never given    Sulfa Antibiotics Other (See Comments)     Family allergy, never given     Allergy historyupdated.     Outpatient Medications Marked as Taking for the 4/25/19 encounter (Office Visit) with Dee Maradiaga MD   Medication Sig Dispense Refill    [DISCONTINUED] Liraglutide (VICTOZA) 18 MG/3ML SOPN SC injection Inject 0.6 mg into the skin daily 2 pen 0    SUMAtriptan (IMITREX) 100 MG tablet Take 1 tablet by mouth as needed for Migraine 9 tablet 5    butalbital-APAP-caffeine -40 MG CAPS per capsule TAKE ONE CAPSULE BY MOUTH AS NEEDED FOR HEADACHE 30 capsule 0    Elastic Bandages & Supports (WRIST BRACE DELUXE/RIGHT S/M) MISC Wear prn, while driving or at night 1 each 0    Biotin w/ Vitamins C & E (HAIR SKIN & NAILS GUMMIES PO) Take 2 each by mouth daily      Black Cohosh 40 MG CAPS Take 1 tablet by mouth daily      omeprazole (PRILOSEC) 20 MG delayed release capsule Take 20 mg by mouth daily      guaiFENesin (MUCINEX) 600 MG extended release tablet Take 1 tablet by mouth 2 times daily 30 tablet 0    LORazepam (ATIVAN) 0.5 MG tablet Take 0.5 mg by mouth as needed for Anxiety      Cholecalciferol (VITAMIN D3) 2000 UNITS CAPS Take 1 capsule by mouth       Coenzyme Q10 (CO Q 10) 100 MG CAPS Take by mouth daily       b complex vitamins capsule Take 1 capsule by mouth daily          Tobacco use history updated. Social History     Tobacco Use   Smoking Status Former Smoker    Packs/day: 0.50    Years: 16.00    Pack years: 8.00    Types: Cigarettes    Start date: 44    Last attempt to quit: 2009    Years since quitting: 10.3   Smokeless Tobacco Never Used        Nursing note reviewed. Vitals:    04/25/19 1236   BP: 114/78   Site: Right Upper Arm   Position: Sitting   Cuff Size: Large Adult   Pulse: 99   SpO2: 99%   Weight: 191 lb 9.6 oz (86.9 kg)          BP Readings from Last 3 Encounters:   04/25/19 114/78   03/12/19 130/82   01/16/19 122/82     Wt Readings from Last 3 Encounters:   04/25/19 191 lb 9.6 oz (86.9 kg)   04/15/19 196 lb (88.9 kg)   03/12/19 201 lb (91.2 kg)     Body mass index is 34.21 kg/m². No results found for this visit on 04/25/19. Physical Exam   Constitutional: She is oriented to person, place, and time. She appears well-developed and well-nourished. No distress. HENT:   Head: Normocephalic. Mouth/Throat: Oropharynx is clear and moist.   Eyes: Pupils are equal, round, and reactive to light. Conjunctivae are normal. Right eye exhibits no discharge. Left eye exhibits no discharge. Neck: Normal range of motion. Cardiovascular: Normal rate, regular rhythm and normal heart sounds. No murmur heard.   Pulmonary/Chest: Effort normal and breath sounds normal. No respiratory distress. She has no wheezes. She has no rales. Neurological: She is alert and oriented to person, place, and time. Skin: Skin is warm and dry. She is not diaphoretic. Psychiatric: She has a normal mood and affect. Her behavior is normal.   Nursing note and vitals reviewed. _________________________________________________  Assessment:     Kitty Zepeda was seen today for abdominal pain. Diagnoses and all orders for this visit:    RLQ abdominal pain  -     CBC Auto Differential  -     HEPATITIS PANEL, ACUTE  -     Comprehensive Metabolic Panel    Diarrhea, unspecified type  -     CBC Auto Differential  -     HEPATITIS PANEL, ACUTE  -     Comprehensive Metabolic Panel      Problems listed above are stable and therapeutic plan is unchanged unless otherwise specified. See orders above and comments below for details of workup ormedication orders. _________________________________________________  Plan:     Clear liquids, rest, if not better then call back. Offered work note for tomorrow she will let me know if she needs this. Nandini [place] for now. Return if symptoms worsen or fail to improve.       Electronically signed by Karrie Rosenbaum MD on 4/25/19 at 12:48 PM

## 2019-04-25 NOTE — PATIENT INSTRUCTIONS
STOP victoza for now. Use simethicone (mylanta anti-gas) for distension. IF WORSE GO TO ER. Call in AM if not better and we would order CT abdomen probably.

## 2019-04-26 LAB
A/G RATIO: 1.6 (ref 1.1–2.2)
ALBUMIN SERPL-MCNC: 4.6 G/DL (ref 3.4–5)
ALP BLD-CCNC: 96 U/L (ref 40–129)
ALT SERPL-CCNC: 41 U/L (ref 10–40)
ANION GAP SERPL CALCULATED.3IONS-SCNC: 14 MMOL/L (ref 3–16)
AST SERPL-CCNC: 25 U/L (ref 15–37)
ATYPICAL LYMPHOCYTE RELATIVE PERCENT: 1 % (ref 0–6)
BANDED NEUTROPHILS RELATIVE PERCENT: 1 % (ref 0–7)
BASOPHILS ABSOLUTE: 0.1 K/UL (ref 0–0.2)
BASOPHILS RELATIVE PERCENT: 1 %
BILIRUB SERPL-MCNC: 0.3 MG/DL (ref 0–1)
BUN BLDV-MCNC: 11 MG/DL (ref 7–20)
CALCIUM SERPL-MCNC: 9.8 MG/DL (ref 8.3–10.6)
CHLORIDE BLD-SCNC: 107 MMOL/L (ref 99–110)
CO2: 25 MMOL/L (ref 21–32)
CREAT SERPL-MCNC: 0.7 MG/DL (ref 0.6–1.1)
EOSINOPHILS ABSOLUTE: 0.1 K/UL (ref 0–0.6)
EOSINOPHILS RELATIVE PERCENT: 2 %
GFR AFRICAN AMERICAN: >60
GFR NON-AFRICAN AMERICAN: >60
GLOBULIN: 2.9 G/DL
GLUCOSE BLD-MCNC: 92 MG/DL (ref 70–99)
HAV IGM SER IA-ACNC: NORMAL
HCT VFR BLD CALC: 42 % (ref 36–48)
HEMOGLOBIN: 13.9 G/DL (ref 12–16)
HEPATITIS B CORE IGM ANTIBODY: NORMAL
HEPATITIS B SURFACE ANTIGEN INTERPRETATION: NORMAL
HEPATITIS C ANTIBODY INTERPRETATION: NORMAL
LYMPHOCYTES ABSOLUTE: 4.6 K/UL (ref 1–5.1)
LYMPHOCYTES RELATIVE PERCENT: 63 %
MCH RBC QN AUTO: 28.3 PG (ref 26–34)
MCHC RBC AUTO-ENTMCNC: 33.1 G/DL (ref 31–36)
MCV RBC AUTO: 85.8 FL (ref 80–100)
MONOCYTES ABSOLUTE: 0.8 K/UL (ref 0–1.3)
MONOCYTES RELATIVE PERCENT: 11 %
NEUTROPHILS ABSOLUTE: 1.6 K/UL (ref 1.7–7.7)
NEUTROPHILS RELATIVE PERCENT: 21 %
PDW BLD-RTO: 13.3 % (ref 12.4–15.4)
PLATELET # BLD: 389 K/UL (ref 135–450)
PMV BLD AUTO: 8.4 FL (ref 5–10.5)
POTASSIUM SERPL-SCNC: 4.3 MMOL/L (ref 3.5–5.1)
RBC # BLD: 4.9 M/UL (ref 4–5.2)
SLIDE REVIEW: ABNORMAL
SODIUM BLD-SCNC: 146 MMOL/L (ref 136–145)
TOTAL PROTEIN: 7.5 G/DL (ref 6.4–8.2)
WBC # BLD: 7.2 K/UL (ref 4–11)

## 2019-05-12 ASSESSMENT — ENCOUNTER SYMPTOMS
BACK PAIN: 0
ABDOMINAL PAIN: 0
NAUSEA: 0
CHEST TIGHTNESS: 0
ABDOMINAL DISTENTION: 1
DIARRHEA: 1
SHORTNESS OF BREATH: 0
WHEEZING: 0
COUGH: 0

## 2019-05-13 ENCOUNTER — OFFICE VISIT (OUTPATIENT)
Dept: FAMILY MEDICINE CLINIC | Age: 39
End: 2019-05-13
Payer: COMMERCIAL

## 2019-05-13 VITALS
OXYGEN SATURATION: 98 % | WEIGHT: 189.8 LBS | HEART RATE: 78 BPM | DIASTOLIC BLOOD PRESSURE: 84 MMHG | BODY MASS INDEX: 33.89 KG/M2 | SYSTOLIC BLOOD PRESSURE: 120 MMHG

## 2019-05-13 DIAGNOSIS — R73.9 HYPERGLYCEMIA: ICD-10-CM

## 2019-05-13 DIAGNOSIS — R10.31 RLQ ABDOMINAL PAIN: Primary | ICD-10-CM

## 2019-05-13 PROCEDURE — 99213 OFFICE O/P EST LOW 20 MIN: CPT | Performed by: FAMILY MEDICINE

## 2019-05-13 ASSESSMENT — PATIENT HEALTH QUESTIONNAIRE - PHQ9
SUM OF ALL RESPONSES TO PHQ9 QUESTIONS 1 & 2: 0
SUM OF ALL RESPONSES TO PHQ QUESTIONS 1-9: 0
2. FEELING DOWN, DEPRESSED OR HOPELESS: 0
SUM OF ALL RESPONSES TO PHQ QUESTIONS 1-9: 0
1. LITTLE INTEREST OR PLEASURE IN DOING THINGS: 0

## 2019-05-13 NOTE — PROGRESS NOTES
Skin: Skin is warm and dry. She is not diaphoretic. Psychiatric: She has a normal mood and affect. Her behavior is normal.   Nursing note and vitals reviewed. _________________________________________________  Assessment:     Horacio was seen today for abdominal pain and weight loss. Diagnoses and all orders for this visit:    RLQ abdominal pain    Hyperglycemia      Problems listed above are stable and therapeutic plan is unchanged unless otherwise specified. See orders above and comments below for details of workup ormedication orders. _________________________________________________  Plan:     If run out of samples then consider saxenda at the hospital pharmacy. We will continue victoza for now and until gets to goal weight. Can increase to 1.8 if wants, but I would wait a few weeks to try this. Return in about 2 months (around 7/13/2019), or if symptoms worsen or fail to improve, for recheck on new med.       Electronically signed by Wong Loo MD on 5/13/19 at 3:39 PM

## 2019-05-14 ASSESSMENT — ENCOUNTER SYMPTOMS
CHEST TIGHTNESS: 0
COUGH: 0
SHORTNESS OF BREATH: 0
BACK PAIN: 0
ABDOMINAL PAIN: 1
WHEEZING: 0

## 2019-05-21 ENCOUNTER — TELEPHONE (OUTPATIENT)
Dept: FAMILY MEDICINE CLINIC | Age: 39
End: 2019-05-21

## 2019-06-28 ENCOUNTER — TELEPHONE (OUTPATIENT)
Dept: FAMILY MEDICINE CLINIC | Age: 39
End: 2019-06-28

## 2019-07-16 ENCOUNTER — OFFICE VISIT (OUTPATIENT)
Dept: FAMILY MEDICINE CLINIC | Age: 39
End: 2019-07-16
Payer: COMMERCIAL

## 2019-07-16 VITALS
HEIGHT: 63 IN | DIASTOLIC BLOOD PRESSURE: 80 MMHG | SYSTOLIC BLOOD PRESSURE: 128 MMHG | OXYGEN SATURATION: 99 % | HEART RATE: 81 BPM | BODY MASS INDEX: 32.74 KG/M2 | WEIGHT: 184.8 LBS

## 2019-07-16 DIAGNOSIS — R73.9 HYPERGLYCEMIA: ICD-10-CM

## 2019-07-16 DIAGNOSIS — R10.31 RLQ ABDOMINAL PAIN: ICD-10-CM

## 2019-07-16 DIAGNOSIS — E66.9 OBESITY (BMI 30.0-34.9): ICD-10-CM

## 2019-07-16 DIAGNOSIS — G43.109 MIGRAINE EQUIVALENT SYNDROME: Primary | ICD-10-CM

## 2019-07-16 PROCEDURE — 99214 OFFICE O/P EST MOD 30 MIN: CPT | Performed by: FAMILY MEDICINE

## 2019-07-16 RX ORDER — OMEPRAZOLE 20 MG/1
20 CAPSULE, DELAYED RELEASE ORAL DAILY
Qty: 90 CAPSULE | Refills: 2 | Status: SHIPPED | OUTPATIENT
Start: 2019-07-16 | End: 2019-11-19 | Stop reason: SDUPTHER

## 2019-07-16 ASSESSMENT — PATIENT HEALTH QUESTIONNAIRE - PHQ9
1. LITTLE INTEREST OR PLEASURE IN DOING THINGS: 0
SUM OF ALL RESPONSES TO PHQ QUESTIONS 1-9: 0
2. FEELING DOWN, DEPRESSED OR HOPELESS: 0
SUM OF ALL RESPONSES TO PHQ9 QUESTIONS 1 & 2: 0
SUM OF ALL RESPONSES TO PHQ QUESTIONS 1-9: 0

## 2019-07-17 ASSESSMENT — ENCOUNTER SYMPTOMS
CHEST TIGHTNESS: 0
COUGH: 0
SHORTNESS OF BREATH: 0
BACK PAIN: 0
ABDOMINAL PAIN: 0
WHEEZING: 0

## 2019-07-18 ENCOUNTER — TELEPHONE (OUTPATIENT)
Dept: FAMILY MEDICINE CLINIC | Age: 39
End: 2019-07-18

## 2019-07-26 ENCOUNTER — NURSE ONLY (OUTPATIENT)
Dept: FAMILY MEDICINE CLINIC | Age: 39
End: 2019-07-26
Payer: COMMERCIAL

## 2019-07-26 DIAGNOSIS — R30.0 DYSURIA: Primary | ICD-10-CM

## 2019-07-26 LAB
BILIRUBIN, POC: NEGATIVE
BLOOD URINE, POC: NEGATIVE
CLARITY, POC: CLEAR
COLOR, POC: YELLOW
GLUCOSE URINE, POC: NEGATIVE
KETONES, POC: NEGATIVE
LEUKOCYTE EST, POC: ABNORMAL
NITRITE, POC: NEGATIVE
PH, POC: 6.5
PROTEIN, POC: NEGATIVE
SPECIFIC GRAVITY, POC: 1015
UROBILINOGEN, POC: 0.2

## 2019-07-26 PROCEDURE — 81002 URINALYSIS NONAUTO W/O SCOPE: CPT | Performed by: FAMILY MEDICINE

## 2019-07-26 RX ORDER — CIPROFLOXACIN 500 MG/1
500 TABLET, FILM COATED ORAL 2 TIMES DAILY
Qty: 10 TABLET | Refills: 1 | Status: SHIPPED | OUTPATIENT
Start: 2019-07-26 | End: 2020-05-05 | Stop reason: SDUPTHER

## 2019-07-26 RX ORDER — PHENAZOPYRIDINE HYDROCHLORIDE 200 MG/1
200 TABLET, FILM COATED ORAL 3 TIMES DAILY PRN
Qty: 12 TABLET | Refills: 1 | Status: SHIPPED | OUTPATIENT
Start: 2019-07-26 | End: 2020-05-05 | Stop reason: SDUPTHER

## 2019-07-26 NOTE — PROGRESS NOTES
Here in office working with dysuria, requesting medication. U-A in chart. Culture sent. Patient aware script sent.

## 2019-07-27 LAB — URINE CULTURE, ROUTINE: NORMAL

## 2019-10-15 ENCOUNTER — OFFICE VISIT (OUTPATIENT)
Dept: FAMILY MEDICINE CLINIC | Age: 39
End: 2019-10-15
Payer: COMMERCIAL

## 2019-10-15 VITALS
TEMPERATURE: 97.9 F | WEIGHT: 185.2 LBS | BODY MASS INDEX: 33.07 KG/M2 | OXYGEN SATURATION: 98 % | HEART RATE: 80 BPM | DIASTOLIC BLOOD PRESSURE: 80 MMHG | SYSTOLIC BLOOD PRESSURE: 122 MMHG

## 2019-10-15 DIAGNOSIS — J06.9 UPPER RESPIRATORY TRACT INFECTION, UNSPECIFIED TYPE: Primary | ICD-10-CM

## 2019-10-15 LAB
INFLUENZA VIRUS A RNA: NEGATIVE
INFLUENZA VIRUS B RNA: NEGATIVE

## 2019-10-15 PROCEDURE — 99213 OFFICE O/P EST LOW 20 MIN: CPT | Performed by: FAMILY MEDICINE

## 2019-10-15 PROCEDURE — 87502 INFLUENZA DNA AMP PROBE: CPT | Performed by: FAMILY MEDICINE

## 2019-10-15 RX ORDER — AZITHROMYCIN 250 MG/1
TABLET, FILM COATED ORAL
Qty: 6 TABLET | Refills: 0 | Status: SHIPPED | OUTPATIENT
Start: 2019-10-15 | End: 2019-10-25

## 2019-10-31 DIAGNOSIS — G43.109 MIGRAINE EQUIVALENT SYNDROME: ICD-10-CM

## 2019-10-31 RX ORDER — BUTALBITAL, ACETAMINOPHEN AND CAFFEINE 300; 40; 50 MG/1; MG/1; MG/1
1 CAPSULE ORAL EVERY 6 HOURS PRN
Qty: 30 CAPSULE | Refills: 0 | Status: SHIPPED | OUTPATIENT
Start: 2019-10-31

## 2019-11-07 DIAGNOSIS — F41.1 ANXIETY REACTION: Primary | ICD-10-CM

## 2019-11-07 RX ORDER — LORAZEPAM 0.5 MG/1
0.5 TABLET ORAL PRN
Qty: 15 TABLET | Refills: 1 | Status: SHIPPED | OUTPATIENT
Start: 2019-11-07 | End: 2019-12-07

## 2019-11-19 ENCOUNTER — OFFICE VISIT (OUTPATIENT)
Dept: FAMILY MEDICINE CLINIC | Age: 39
End: 2019-11-19
Payer: COMMERCIAL

## 2019-11-19 VITALS
OXYGEN SATURATION: 96 % | HEART RATE: 89 BPM | DIASTOLIC BLOOD PRESSURE: 80 MMHG | BODY MASS INDEX: 33.31 KG/M2 | WEIGHT: 188 LBS | HEIGHT: 63 IN | SYSTOLIC BLOOD PRESSURE: 124 MMHG

## 2019-11-19 DIAGNOSIS — G43.109 MIGRAINE EQUIVALENT SYNDROME: Primary | ICD-10-CM

## 2019-11-19 DIAGNOSIS — R73.9 HYPERGLYCEMIA: ICD-10-CM

## 2019-11-19 DIAGNOSIS — R10.31 RLQ ABDOMINAL PAIN: ICD-10-CM

## 2019-11-19 DIAGNOSIS — Z12.31 SCREENING MAMMOGRAM, ENCOUNTER FOR: ICD-10-CM

## 2019-11-19 DIAGNOSIS — J30.9 ALLERGIC RHINITIS, UNSPECIFIED SEASONALITY, UNSPECIFIED TRIGGER: ICD-10-CM

## 2019-11-19 PROCEDURE — 99214 OFFICE O/P EST MOD 30 MIN: CPT | Performed by: FAMILY MEDICINE

## 2019-11-19 RX ORDER — OMEPRAZOLE 20 MG/1
20 CAPSULE, DELAYED RELEASE ORAL DAILY
Qty: 90 CAPSULE | Refills: 2 | Status: SHIPPED | OUTPATIENT
Start: 2019-11-19 | End: 2020-11-12

## 2019-11-19 RX ORDER — CETIRIZINE HYDROCHLORIDE 10 MG/1
10 TABLET ORAL DAILY
Qty: 90 TABLET | Refills: 1 | Status: SHIPPED | OUTPATIENT
Start: 2019-11-19

## 2019-11-30 ASSESSMENT — ENCOUNTER SYMPTOMS
COUGH: 0
SHORTNESS OF BREATH: 0
CHEST TIGHTNESS: 0
BACK PAIN: 0
WHEEZING: 0
ABDOMINAL PAIN: 0

## 2020-01-09 RX ORDER — ERENUMAB-AOOE 70 MG/ML
INJECTION SUBCUTANEOUS
Qty: 1 ML | Refills: 4 | Status: SHIPPED | OUTPATIENT
Start: 2020-01-09 | End: 2021-10-29

## 2020-01-21 ENCOUNTER — OFFICE VISIT (OUTPATIENT)
Dept: FAMILY MEDICINE CLINIC | Age: 40
End: 2020-01-21
Payer: COMMERCIAL

## 2020-01-21 ENCOUNTER — HOSPITAL ENCOUNTER (OUTPATIENT)
Age: 40
Discharge: HOME | End: 2020-01-21
Payer: COMMERCIAL

## 2020-01-21 LAB
TOTAL CK: 101 IU/L (ref 26–140)
TROPONIN T: <0.01 NG/ML

## 2020-01-21 PROCEDURE — 82550 ASSAY OF CK (CPK): CPT

## 2020-01-21 PROCEDURE — 36415 COLL VENOUS BLD VENIPUNCTURE: CPT

## 2020-01-21 PROCEDURE — 84484 ASSAY OF TROPONIN QUANT: CPT

## 2020-01-21 PROCEDURE — 93000 ELECTROCARDIOGRAM COMPLETE: CPT | Performed by: FAMILY MEDICINE

## 2020-01-21 NOTE — PROGRESS NOTES
Chief Complaint   Patient presents with    Chest Pain     patient with atypical chest pain presenting during work hours to be seen       [place] NARRATIVE:    Patient admits to stress level being higher, working long hours. Also woke up during the night with chest pain that lasted about half an hour. Has some anxiety, but BP is ok. No sweats or nausea, ledesma feel anxious. No radiation of pain to arm or anywhere else. Mild sob and anxious now. Patient is established unless otherwise noted. Above chief complaint and [place] obtained by this physician provider. Review of Systems   Constitutional: Negative for activity change, chills, fatigue and fever. HENT: Negative for congestion. Respiratory: Positive for chest tightness and shortness of breath (mildly). Negative for cough and wheezing. Cardiovascular: Negative for chest pain and leg swelling. Gastrointestinal: Negative for abdominal pain. Musculoskeletal: Negative for back pain and myalgias. Skin: Negative for rash. Psychiatric/Behavioral: Negative for behavioral problems and dysphoric mood. Allergies   Allergen Reactions    Vicodin [Hydrocodone-Acetaminophen]      Makes patient nauseated.  Penicillins Other (See Comments)     Family allergy, never given  Mom has never had but strong family reaction.  Sulfa Antibiotics Other (See Comments)     Family allergy, never given    Hydrocodone-Acetaminophen Nausea And Vomiting     Allergy historyupdated. No outpatient medications have been marked as taking for the 20 encounter (Office Visit) with Benjamin Padilla MD.       Tobacco use history updated. Social History     Tobacco Use   Smoking Status Former Smoker    Packs/day: 0.50    Years: 16.00    Pack years: 8.00    Types: Cigarettes    Start date: 44    Last attempt to quit:     Years since quittin.0   Smokeless Tobacco Never Used        Nursing note reviewed. There were no vitals filed for this visit. BP Readings from Last 3 Encounters:   11/19/19 124/80   10/15/19 122/80   10/12/19 126/84     Wt Readings from Last 3 Encounters:   11/19/19 188 lb (85.3 kg)   10/15/19 185 lb 3.2 oz (84 kg)   10/12/19 183 lb 8 oz (83.2 kg)     There is no height or weight on file to calculate BMI. No results found for this visit on 01/21/20. Physical Exam  Vitals signs and nursing note reviewed. Constitutional:       General: She is not in acute distress. Appearance: She is well-developed. She is not diaphoretic. HENT:      Head: Normocephalic. Eyes:      General:         Right eye: No discharge. Left eye: No discharge. Conjunctiva/sclera: Conjunctivae normal.      Pupils: Pupils are equal, round, and reactive to light. Neck:      Musculoskeletal: Normal range of motion. Cardiovascular:      Rate and Rhythm: Normal rate and regular rhythm. Heart sounds: Normal heart sounds. No murmur. Pulmonary:      Effort: Pulmonary effort is normal. No respiratory distress. Breath sounds: Normal breath sounds. No wheezing or rales. Skin:     General: Skin is warm and dry. Neurological:      Mental Status: She is alert and oriented to person, place, and time. Psychiatric:         Behavior: Behavior normal.         EKG in chart, read by computer as old inferior infarct. I disagree with old infarct I think irregular baseline. No acute changes regardless. _________________________________________________  Assessment:     Reyes Boros was seen today for chest pain. Diagnoses and all orders for this visit:    Chest pain, unspecified type  -     Cancel: EKG 12 lead  -     EKG 12 lead  -     Cancel: TROPONIN  -     Cancel: CK  -     Cancel: TROPONIN; Future  -     Cancel: CK; Future      Problems listed above are stable except as follows: EKG is non acute and risk is low. Patient declines ER referral for now and I think acceptable.   She will go to [facility] and get stat cardiac enzymes, if

## 2020-02-04 ASSESSMENT — ENCOUNTER SYMPTOMS
ABDOMINAL PAIN: 0
SHORTNESS OF BREATH: 1
COUGH: 0
CHEST TIGHTNESS: 1
BACK PAIN: 0
WHEEZING: 0

## 2020-03-30 ENCOUNTER — PATIENT MESSAGE (OUTPATIENT)
Dept: FAMILY MEDICINE CLINIC | Age: 40
End: 2020-03-30

## 2020-05-05 ENCOUNTER — E-VISIT (OUTPATIENT)
Dept: FAMILY MEDICINE CLINIC | Age: 40
End: 2020-05-05
Payer: COMMERCIAL

## 2020-05-05 PROCEDURE — 99421 OL DIG E/M SVC 5-10 MIN: CPT | Performed by: FAMILY MEDICINE

## 2020-05-05 RX ORDER — CIPROFLOXACIN 500 MG/1
500 TABLET, FILM COATED ORAL 2 TIMES DAILY
Qty: 10 TABLET | Refills: 1 | Status: SHIPPED | OUTPATIENT
Start: 2020-05-05 | End: 2020-05-10

## 2020-05-05 RX ORDER — PHENAZOPYRIDINE HYDROCHLORIDE 200 MG/1
200 TABLET, FILM COATED ORAL 3 TIMES DAILY PRN
Qty: 12 TABLET | Refills: 1 | Status: SHIPPED | OUTPATIENT
Start: 2020-05-05 | End: 2020-05-08

## 2020-05-11 ENCOUNTER — OFFICE VISIT (OUTPATIENT)
Dept: FAMILY MEDICINE CLINIC | Age: 40
End: 2020-05-11
Payer: COMMERCIAL

## 2020-05-11 VITALS
WEIGHT: 182 LBS | DIASTOLIC BLOOD PRESSURE: 88 MMHG | HEART RATE: 102 BPM | SYSTOLIC BLOOD PRESSURE: 126 MMHG | OXYGEN SATURATION: 95 % | BODY MASS INDEX: 32.5 KG/M2

## 2020-05-11 LAB
BILIRUBIN, POC: ABNORMAL
BLOOD URINE, POC: ABNORMAL
CLARITY, POC: CLEAR
COLOR, POC: YELLOW
GLUCOSE URINE, POC: ABNORMAL
KETONES, POC: ABNORMAL
LEUKOCYTE EST, POC: ABNORMAL
NITRITE, POC: ABNORMAL
PH, POC: 6.5
PROTEIN, POC: ABNORMAL
SPECIFIC GRAVITY, POC: 1.01
UROBILINOGEN, POC: ABNORMAL

## 2020-05-11 PROCEDURE — 81002 URINALYSIS NONAUTO W/O SCOPE: CPT | Performed by: FAMILY MEDICINE

## 2020-05-11 PROCEDURE — 99214 OFFICE O/P EST MOD 30 MIN: CPT | Performed by: FAMILY MEDICINE

## 2020-05-11 RX ORDER — LORAZEPAM 0.5 MG/1
0.5 TABLET ORAL DAILY
COMMUNITY

## 2020-05-11 RX ORDER — NITROFURANTOIN 25; 75 MG/1; MG/1
100 CAPSULE ORAL 2 TIMES DAILY
Qty: 20 CAPSULE | Refills: 0 | Status: SHIPPED | OUTPATIENT
Start: 2020-05-11 | End: 2020-05-21

## 2020-05-11 RX ORDER — ESCITALOPRAM OXALATE 20 MG/1
20 TABLET ORAL DAILY
Qty: 30 TABLET | Refills: 2 | Status: SHIPPED | OUTPATIENT
Start: 2020-05-11 | End: 2020-11-12

## 2020-05-11 RX ORDER — TOLTERODINE TARTRATE 2 MG/1
2 TABLET, EXTENDED RELEASE ORAL 2 TIMES DAILY PRN
Qty: 60 TABLET | Refills: 1 | Status: SHIPPED | OUTPATIENT
Start: 2020-05-11 | End: 2021-10-29

## 2020-05-11 ASSESSMENT — ENCOUNTER SYMPTOMS
SHORTNESS OF BREATH: 0
CHEST TIGHTNESS: 0
BACK PAIN: 0
ABDOMINAL PAIN: 0
COUGH: 0
WHEEZING: 0

## 2020-05-11 ASSESSMENT — PATIENT HEALTH QUESTIONNAIRE - PHQ9
2. FEELING DOWN, DEPRESSED OR HOPELESS: 1
SUM OF ALL RESPONSES TO PHQ QUESTIONS 1-9: 2
SUM OF ALL RESPONSES TO PHQ9 QUESTIONS 1 & 2: 2
SUM OF ALL RESPONSES TO PHQ QUESTIONS 1-9: 2
1. LITTLE INTEREST OR PLEASURE IN DOING THINGS: 1

## 2020-05-11 NOTE — PROGRESS NOTES
Medications Marked as Taking for the 20 encounter (Office Visit) with Leni Villanueva MD   Medication Sig Dispense Refill    LORazepam (ATIVAN) 0.5 MG tablet Take 0.5 mg by mouth daily.  nitrofurantoin, macrocrystal-monohydrate, (MACROBID) 100 MG capsule Take 1 capsule by mouth 2 times daily for 10 days 20 capsule 0    tolterodine (DETROL) 2 MG tablet Take 1 tablet by mouth 2 times daily as needed (spasm) 60 tablet 1    escitalopram (LEXAPRO) 20 MG tablet Take 1 tablet by mouth daily 30 tablet 2    AIMOVIG 70 MG/ML SOAJ INJECT 70MG (CONTENTS OF 1 PEN) UNDER THE SKIN EVERY 30 DAYS 1 mL 4    cetirizine (ZYRTEC) 10 MG tablet Take 1 tablet by mouth daily 90 tablet 1    butalbital-APAP-caffeine -40 MG CAPS per capsule Take 1 capsule by mouth every 6 hours as needed for Headaches 30 capsule 0    SUMAtriptan (IMITREX) 100 MG tablet Take 1 tablet by mouth as needed for Migraine 9 tablet 5    Biotin w/ Vitamins C & E (HAIR SKIN & NAILS GUMMIES PO) Take 2 each by mouth daily      Black Cohosh 40 MG CAPS Take 1 tablet by mouth daily      Cholecalciferol (VITAMIN D3) 2000 UNITS CAPS Take 1 capsule by mouth       Coenzyme Q10 (CO Q 10) 100 MG CAPS Take by mouth daily       b complex vitamins capsule Take 1 capsule by mouth daily          Tobacco use history updated. Social History     Tobacco Use   Smoking Status Former Smoker    Packs/day: 0.50    Years: 16.00    Pack years: 8.00    Types: Cigarettes    Start date:     Last attempt to quit:     Years since quittin.3   Smokeless Tobacco Never Used        Nursing note reviewed.     Vitals:    20 1437   BP: 126/88   Site: Left Upper Arm   Position: Sitting   Cuff Size: Medium Adult   Pulse: 102   SpO2: 95%   Weight: 182 lb (82.6 kg)          BP Readings from Last 3 Encounters:   20 126/88   19 124/80   10/15/19 122/80     Wt Readings from Last 3 Encounters:   20 182 lb (82.6 kg)   19 188 lb (85.3 kg)

## 2020-05-11 NOTE — PATIENT INSTRUCTIONS
Patient Education        Interstitial Cystitis: Care Instructions  Your Care Instructions    Interstitial cystitis is a long-term irritation of the bladder. It can cause mild to severe pain that comes and goes. You also may feel a sudden urge to urinate or need to urinate often. Sometimes the walls of the bladder become scarred or get stiff. Doctors do not know what causes interstitial cystitis. But they do know that it is not caused by an infection. The problem is much more common in women than in men. Your doctor may do tests to make sure that you do not have an infection, kidney stones, or bladder cancer. Because the cause of interstitial cystitis is not known, your doctor may try several treatments. It may take several weeks or months to find a treatment that works. If diet and lifestyle changes do not help, you may need medicine. Your doctor may also put liquid or medicine into your bladder for a short time to treat the pain. Follow-up care is a key part of your treatment and safety. Be sure to make and go to all appointments, and call your doctor if you are having problems. It's also a good idea to know your test results and keep a list of the medicines you take. How can you care for yourself at home? · Take your medicines exactly as prescribed. Call your doctor if you think you are having a problem with your medicine. · If your doctor prescribed antibiotics, take them as directed. Do not stop taking them just because you feel better. You need to take the full course of antibiotics. · Avoid eating spicy foods or high-acid foods, such as tomatoes and oranges, if these foods seem to make your pain worse. Also, limit caffeine and alcohol. · If a certain food seems to cause pain in your bladder, stop eating it to see if the pain goes away. · Do not smoke. Smoking can irritate the bladder and cause bladder cancer. If you need help quitting, talk to your doctor about stop-smoking programs and medicines.

## 2020-08-13 ENCOUNTER — HOSPITAL ENCOUNTER (OUTPATIENT)
Dept: MAMMOGRAPHY | Age: 40
Discharge: HOME | End: 2020-08-13
Payer: COMMERCIAL

## 2020-08-13 PROCEDURE — 77063 BREAST TOMOSYNTHESIS BI: CPT

## 2020-11-12 RX ORDER — ESCITALOPRAM OXALATE 20 MG/1
TABLET ORAL
Qty: 30 TABLET | Refills: 12 | Status: SHIPPED | OUTPATIENT
Start: 2020-11-12 | End: 2021-10-29

## 2020-11-12 RX ORDER — OMEPRAZOLE 20 MG/1
CAPSULE, DELAYED RELEASE ORAL
Qty: 90 CAPSULE | Refills: 3 | Status: SHIPPED | OUTPATIENT
Start: 2020-11-12 | End: 2021-10-29 | Stop reason: SDUPTHER

## 2021-01-21 ENCOUNTER — NURSE TRIAGE (OUTPATIENT)
Dept: OTHER | Facility: CLINIC | Age: 41
End: 2021-01-21

## 2021-01-21 ENCOUNTER — OFFICE VISIT (OUTPATIENT)
Dept: PRIMARY CARE CLINIC | Age: 41
End: 2021-01-21
Payer: COMMERCIAL

## 2021-01-21 VITALS — TEMPERATURE: 97.2 F

## 2021-01-21 DIAGNOSIS — R68.89 FLU-LIKE SYMPTOMS: Primary | ICD-10-CM

## 2021-01-21 PROCEDURE — 99213 OFFICE O/P EST LOW 20 MIN: CPT | Performed by: PHYSICIAN ASSISTANT

## 2021-01-21 NOTE — PROGRESS NOTES
1/21/21  Kathy Spencer  1980    FLU/COVID-19 CLINIC EVALUATION    HPI SYMPTOMS:    [address]    [] Fevers  [] Chills  [x] Cough  [] Coughing up blood  [x] Chest Congestion  [x] Nasal Congestion  [x] Feeling short of breath  [x] Sometimes  [] Frequently  [] All the time  [x] Chest pain-pressure  [x] Headaches  [x]Tolerable  [] Severe  [] Sore throat  [x] Muscle aches  [] Nausea  [] Vomiting  []Unable to keep fluids down  [] Diarrhea  []Severe    [x] OTHER SYMPTOMS:  Fatigue  Facial/Teeth pain     Symptom Duration:   [] 1  [] 2   [] 3   [x] 4    [] 5   [] 6   [] 7   [] 8   [] 9   [] 10   [] 11   [] 12   [] 13   [] 14   [] Longer than 14 days    Symptom course:   [x] Worsening     [] Stable     [] Improving    RISK FACTORS:    [] Pregnant or possibly pregnant  [] Age over 61  [] Diabetes  [] Heart disease  [] Asthma  [] COPD/Other chronic lung diseases  [] Active Cancer  [] On Chemotherapy  [] Taking oral steroids  [] History Lymphoma/Leukemia  [] Close contact with a lab confirmed COVID-19 patient within 14 days of symptom onset  [] History of travel from affected geographical areas within 14 days of symptom onset       VITALS:  There were no vitals filed for this visit. TESTS:    POCT FLU:  [] Positive     []Negative    ASSESSMENT:    [] Flu  [] Possible COVID-19  [] Strep    PLAN:    [] Discharge home with written instructions for:  [] Flu management  [] Possible COVID-19 infection with self-quarantine and management of symptoms  [] Follow-up with primary care physician or emergency department if worsens  [] Evaluation per physician/NP/PA in clinic  [] Sent to ER       An  electronic signature was used to authenticate this note.      --Whitney Valadez on 1/21/2021 at 4:34 PM

## 2021-01-21 NOTE — PROGRESS NOTES
1/21/2021    HPI:  Chief complaint and history of present illness as per medical assistant/nurse documented today in the Flu/COVID-19 clinic. 4-day history of cough, chest and nasal congestion, shortness of breath, chest pressure, mild generalized headache, body aches, fatigue, facial pressure, dental pain. Patient denies chest pain or tightness, wheezing, fever, chills, sore throat, stridor, nausea, vomiting, diarrhea, loss of taste or smell. She has taken over-the-counter Tylenol, DayQuil, NyQuil as needed. No known ill contacts. She works for [employer] and did call occupational health today. She presents to our clinic as she would prefer a face-to-face evaluation. MEDICATIONS:  Prior to Visit Medications    Medication Sig Taking? Authorizing Provider   omeprazole (PRILOSEC) 20 MG delayed release capsule Take 1 capsule by mouth once daily  Winsome Suarez MD   escitalopram (LEXAPRO) 20 MG tablet Take 1 tablet by mouth once daily  Winsome Suarez MD   LORazepam (ATIVAN) 0.5 MG tablet Take 0.5 mg by mouth daily.   Historical Provider, MD   tolterodine (DETROL) 2 MG tablet Take 1 tablet by mouth 2 times daily as needed (spasm)  Winsome Suarez MD   AIMOVIG 70 MG/ML SOAJ INJECT 70MG (CONTENTS OF 1 PEN) UNDER THE SKIN EVERY 27 DAYS  Winsome Suarez MD   cetirizine (ZYRTEC) 10 MG tablet Take 1 tablet by mouth daily  Winsome Suarez MD   butalbital-APAP-caffeine -40 MG CAPS per capsule Take 1 capsule by mouth every 6 hours as needed for Headaches  Ovidio Reynoso MD   estradiol (VIVELLE-DOT) 0.1 MG/24HR film, extended release  transdermal  Historical Provider, MD   SUMAtriptan (IMITREX) 100 MG tablet Take 1 tablet by mouth as needed for Migraine  Winsome Suarez MD   Biotin w/ Vitamins C & E (HAIR SKIN & NAILS GUMMIES PO) Take 2 each by mouth daily  Historical Provider, MD   Black Cohosh 40 MG CAPS Take 1 tablet by mouth daily  Historical Provider, MD [x] [id number] General patient information    [facility]

## 2021-01-21 NOTE — TELEPHONE ENCOUNTER
Brief description of triage: Patient called in to report covid related symptoms, onset 1/18/21, see triage assessment below. Triage indicates for patient to go to the ER. Care advice provided, patient verbalizes understanding; denies any other questions or concerns; instructed to call back for any new or worsening symptoms. Patient states she is not certain she will go to the ER, acknowledged risks of not going. Reason for Disposition   MILD difficulty breathing (e.g., minimal/no SOB at rest, SOB with walking, pulse <100)    Answer Assessment - Initial Assessment Questions  1. COVID-19 DIAGNOSIS: \"Who made your Coronavirus (COVID-19) diagnosis? \" \"Was it confirmed by a positive lab test?\" If not diagnosed by a HCP, ask \"Are there lots of cases (community spread) where you live? \" (See public health department website, if unsure)     Patient has not been tested    2. COVID-19 EXPOSURE: \"Was there any known exposure to COVID before the symptoms began? \" CDC Definition of close contact: within 6 feet (2 meters) for a total of 15 minutes or more over a 24-hour period. Denies    3. ONSET: \"When did the COVID-19 symptoms start?\"      1/18/21    4. WORST SYMPTOM: \"What is your worst symptom? \" (e.g., cough, fever, shortness of breath, muscle aches)      Shortness of breath and pain with deep inspiration    5. COUGH: \"Do you have a cough? \" If so, ask: \"How bad is the cough? \"       Mild cough    6. FEVER: \"Do you have a fever? \" If so, ask: \"What is your temperature, how was it measured, and when did it start? \"      Denies    7. RESPIRATORY STATUS: \"Describe your breathing? \" (e.g., shortness of breath, wheezing, unable to speak)      Shortness of breath; chest and back tightness but DENIES chest pain    8. BETTER-SAME-WORSE: Birgit Osborns you getting better, staying the same or getting worse compared to yesterday? \"  If getting worse, ask, \"In what way? \"      Worse - shortness of breath    9.  HIGH RISK DISEASE: \"Do you have any chronic medical problems? \" (e.g., asthma, heart or lung disease, weak immune system, obesity, etc.)      Denies    10. PREGNANCY: \"Is there any chance you are pregnant? \" \"When was your last menstrual period? \"        NA    11. OTHER SYMPTOMS: \"Do you have any other symptoms? \"  (e.g., chills, fatigue, headache, loss of smell or taste, muscle pain, sore throat; new loss of smell or taste especially support the diagnosis of COVID-19)       Fatigue, mild headache,mild sore throat, chest and back tightness, mild muscle pain, and sinus congestion    Protocols used: CORONAVIRUS (COVID-19) [facility]AGNOSED OR [facility]USPECTED[facility]    Attention Provider: Thank you for allowing me to participate in the care of your patient. The patient was connected to triage in response to possible COVID-19 symptoms. Please do not respond through this encounter as the response is not directed to a shared pool.

## 2021-01-25 ENCOUNTER — CARE COORDINATION (OUTPATIENT)
Dept: CARE COORDINATION | Age: 41
End: 2021-01-25

## 2021-01-25 NOTE — CARE COORDINATION
Yellow alert noted in Loop remote symptom monitoring program. Messaged patient to notify Hannah Palumbo if symptoms have worsened since yesterday.

## 2021-04-07 ENCOUNTER — HOSPITAL ENCOUNTER (OUTPATIENT)
Dept: PHYSICAL THERAPY | Age: 41
Discharge: HOME | End: 2021-04-07

## 2021-04-07 PROCEDURE — 9900000083 HC EXCEL FITNESS INDIVIDUAL

## 2021-10-29 ENCOUNTER — OFFICE VISIT (OUTPATIENT)
Dept: FAMILY MEDICINE CLINIC | Age: 41
End: 2021-10-29
Payer: COMMERCIAL

## 2021-10-29 VITALS
HEART RATE: 88 BPM | SYSTOLIC BLOOD PRESSURE: 142 MMHG | BODY MASS INDEX: 34.46 KG/M2 | WEIGHT: 193 LBS | OXYGEN SATURATION: 94 % | DIASTOLIC BLOOD PRESSURE: 98 MMHG

## 2021-10-29 DIAGNOSIS — R10.31 RLQ ABDOMINAL PAIN: ICD-10-CM

## 2021-10-29 DIAGNOSIS — R42 EPISODIC LIGHTHEADEDNESS: ICD-10-CM

## 2021-10-29 DIAGNOSIS — Z86.39 HISTORY OF VITAMIN D DEFICIENCY: ICD-10-CM

## 2021-10-29 DIAGNOSIS — R53.83 FATIGUE, UNSPECIFIED TYPE: Primary | ICD-10-CM

## 2021-10-29 DIAGNOSIS — I47.9 TACHYCARDIA, PAROXYSMAL (HCC): ICD-10-CM

## 2021-10-29 DIAGNOSIS — Z86.16 HISTORY OF COVID-19: ICD-10-CM

## 2021-10-29 DIAGNOSIS — Z00.00 WELL ADULT EXAM: ICD-10-CM

## 2021-10-29 LAB
A/G RATIO: 1.9 (ref 1.1–2.2)
ALBUMIN SERPL-MCNC: 4.8 G/DL (ref 3.4–5)
ALP BLD-CCNC: 109 U/L (ref 40–129)
ALT SERPL-CCNC: 22 U/L (ref 10–40)
ANION GAP SERPL CALCULATED.3IONS-SCNC: 15 MMOL/L (ref 3–16)
AST SERPL-CCNC: 15 U/L (ref 15–37)
BASOPHILS ABSOLUTE: 0 K/UL (ref 0–0.2)
BASOPHILS RELATIVE PERCENT: 0.5 %
BILIRUB SERPL-MCNC: 0.3 MG/DL (ref 0–1)
BUN BLDV-MCNC: 16 MG/DL (ref 7–20)
CALCIUM SERPL-MCNC: 10 MG/DL (ref 8.3–10.6)
CHLORIDE BLD-SCNC: 103 MMOL/L (ref 99–110)
CHOLESTEROL, TOTAL: 209 MG/DL (ref 0–199)
CO2: 24 MMOL/L (ref 21–32)
CREAT SERPL-MCNC: 0.7 MG/DL (ref 0.6–1.1)
EOSINOPHILS ABSOLUTE: 0.1 K/UL (ref 0–0.6)
EOSINOPHILS RELATIVE PERCENT: 1.3 %
FOLATE: >20 NG/ML (ref 4.78–24.2)
GFR AFRICAN AMERICAN: >60
GFR NON-AFRICAN AMERICAN: >60
GLUCOSE BLD-MCNC: 90 MG/DL (ref 70–99)
HCT VFR BLD CALC: 40.7 % (ref 36–48)
HDLC SERPL-MCNC: 46 MG/DL (ref 40–60)
HEMOGLOBIN: 13.7 G/DL (ref 12–16)
LDL CHOLESTEROL CALCULATED: 140 MG/DL
LYMPHOCYTES ABSOLUTE: 2.3 K/UL (ref 1–5.1)
LYMPHOCYTES RELATIVE PERCENT: 32.7 %
MCH RBC QN AUTO: 28.9 PG (ref 26–34)
MCHC RBC AUTO-ENTMCNC: 33.6 G/DL (ref 31–36)
MCV RBC AUTO: 86.1 FL (ref 80–100)
MONOCYTES ABSOLUTE: 0.5 K/UL (ref 0–1.3)
MONOCYTES RELATIVE PERCENT: 7 %
NEUTROPHILS ABSOLUTE: 4.2 K/UL (ref 1.7–7.7)
NEUTROPHILS RELATIVE PERCENT: 58.5 %
PDW BLD-RTO: 13.3 % (ref 12.4–15.4)
PLATELET # BLD: 342 K/UL (ref 135–450)
PMV BLD AUTO: 7.8 FL (ref 5–10.5)
POTASSIUM SERPL-SCNC: 4.1 MMOL/L (ref 3.5–5.1)
RBC # BLD: 4.73 M/UL (ref 4–5.2)
SODIUM BLD-SCNC: 142 MMOL/L (ref 136–145)
TOTAL PROTEIN: 7.3 G/DL (ref 6.4–8.2)
TRIGL SERPL-MCNC: 114 MG/DL (ref 0–150)
TSH SERPL DL<=0.05 MIU/L-ACNC: 0.82 UIU/ML (ref 0.27–4.2)
VITAMIN B-12: >2000 PG/ML (ref 211–911)
VITAMIN D 25-HYDROXY: 36.9 NG/ML
VLDLC SERPL CALC-MCNC: 23 MG/DL
WBC # BLD: 7.1 K/UL (ref 4–11)

## 2021-10-29 PROCEDURE — 99214 OFFICE O/P EST MOD 30 MIN: CPT | Performed by: FAMILY MEDICINE

## 2021-10-29 RX ORDER — OXYBUTYNIN CHLORIDE 10 MG/1
10 TABLET, EXTENDED RELEASE ORAL DAILY
COMMUNITY
Start: 2021-10-21 | End: 2022-05-24

## 2021-10-29 RX ORDER — OMEPRAZOLE 20 MG/1
CAPSULE, DELAYED RELEASE ORAL
Qty: 90 CAPSULE | Refills: 3 | Status: SHIPPED | OUTPATIENT
Start: 2021-10-29

## 2021-10-29 ASSESSMENT — PATIENT HEALTH QUESTIONNAIRE - PHQ9
SUM OF ALL RESPONSES TO PHQ QUESTIONS 1-9: 0
1. LITTLE INTEREST OR PLEASURE IN DOING THINGS: 0
2. FEELING DOWN, DEPRESSED OR HOPELESS: 0
SUM OF ALL RESPONSES TO PHQ QUESTIONS 1-9: 0
SUM OF ALL RESPONSES TO PHQ QUESTIONS 1-9: 0
SUM OF ALL RESPONSES TO PHQ9 QUESTIONS 1 & 2: 0

## 2021-10-29 NOTE — PROGRESS NOTES
Chief Complaint   Patient presents with    Dizziness     pt reports episodes of chest pain and palpitations with dizziness that resolves and returns at random. pt states dizziness happens frequently through out the day. pt wonders if it is side effect from COVID     Palpitations     episodes occurring several times a day    Hypertension     BP elevated today       [place] NARRATIVE:    As above, she had covid in January. She was sick at home, no hospitalization. Recurrent sx a month ago, very similar to original covid, that's when the chest pain and palps starting then (repeat COVID-19 test was negative). Reports lightheaded dizziness not vertigo. BP has been elevated at home as well, 160/94, 150/100. Sleeps well, eats well. No energy, reducing caffeine because of palpitations did not help and made her more tired. Patient is established unless otherwise noted. Above chief complaint and [place] obtained by this physician provider. Review of Systems   Constitutional: Positive for fatigue. Negative for activity change, chills and fever. HENT: Negative for congestion. Respiratory: Positive for chest tightness. Negative for cough, shortness of breath and wheezing. Cardiovascular: Positive for chest pain and palpitations. Negative for leg swelling. Gastrointestinal: Negative for abdominal pain. Musculoskeletal: Negative for back pain and myalgias. Skin: Negative for rash. Psychiatric/Behavioral: Negative for behavioral problems and dysphoric mood. Allergies   Allergen Reactions    Vicodin [Hydrocodone-Acetaminophen]      Makes patient nauseated.  Penicillins Other (See Comments)     Family allergy, never given  Mom has never had but strong family reaction.  Sulfa Antibiotics Other (See Comments)     Family allergy, never given    Hydrocodone-Acetaminophen Nausea And Vomiting     Allergy historyupdated.     Outpatient Medications Marked as Taking for the 10/29/21 encounter

## 2021-11-02 ENCOUNTER — OFFICE VISIT (OUTPATIENT)
Dept: CARDIOLOGY CLINIC | Age: 41
End: 2021-11-02
Payer: COMMERCIAL

## 2021-11-02 VITALS
SYSTOLIC BLOOD PRESSURE: 180 MMHG | HEART RATE: 74 BPM | HEIGHT: 64 IN | DIASTOLIC BLOOD PRESSURE: 116 MMHG | BODY MASS INDEX: 33.39 KG/M2 | WEIGHT: 195.6 LBS

## 2021-11-02 DIAGNOSIS — R07.9 CHEST PAIN, UNSPECIFIED TYPE: Primary | ICD-10-CM

## 2021-11-02 DIAGNOSIS — R00.0 TACHYCARDIA: ICD-10-CM

## 2021-11-02 DIAGNOSIS — R42 DIZZINESS, NONSPECIFIC: ICD-10-CM

## 2021-11-02 PROCEDURE — 99204 OFFICE O/P NEW MOD 45 MIN: CPT | Performed by: INTERNAL MEDICINE

## 2021-11-02 PROCEDURE — 93000 ELECTROCARDIOGRAM COMPLETE: CPT | Performed by: INTERNAL MEDICINE

## 2021-11-02 RX ORDER — METOPROLOL SUCCINATE 25 MG/1
25 TABLET, EXTENDED RELEASE ORAL DAILY
Qty: 30 TABLET | Refills: 3 | Status: SHIPPED | OUTPATIENT
Start: 2021-11-02 | End: 2021-12-08

## 2021-11-02 NOTE — PATIENT INSTRUCTIONS
**It is YOUR responsibilty to bring medication bottles and/or updated medication list to [address] [place] [address]. This will allow us to better serve you and all your healthcare needs**    Please be informed that if you contact our office outside of normal business hours the physician on call cannot help with any scheduling or rescheduling issues, procedure instruction questions or any type of medication issue. We advise you for any urgent/emergency that you go to the nearest emergency room!     PLEASE CALL OUR OFFICE DURING NORMAL BUSINESS HOURS    Monday - Friday   8 am to 5 pm    [place] [address]: [phone]    [place]:  [phone]

## 2021-11-02 NOTE — LETTER
Giacomo Montilla  1980  [id number]    Have you had any Chest Pain that is not new? - Yes  How does it feel - Tightness, stabbing beneath left breast   Regular - 1 to 2 times weekly   How long does the pain last - a couple minutes    How long have you been having the pain - about 1 month     Have you had any Shortness of Breath - No    Have you had any dizziness - Yes  When do you feel dizzy walking, position change   How long does it last - a couple of minutes    Have you had any palpitations that are not new?  - Yes  Do you feel your heart racing  How long does it last - patient is not sure (she cannot feel it)     Do you have any edema - swelling in left ankle (once in a while)    How long have they been having edema - random (patient unsure of time frame)  Have they worn compression stockings No    Do you have a surgery or procedure scheduled in the near future - No

## 2021-11-02 NOTE — PROGRESS NOTES
CARDIOLOGY NOTE      11/2/2021    RE: Eren Grimm  (1980)                               TO:  Dr. Maryjo Goldstein MD            CHIEF Rachel Ding is a 35 y.o. female who was seen today for management of  Tachycardia, HTN                                    HPI:                   The patient has cardiac complaints  Of fast HR  Patient also seen  for    - Hypertension,is  well controlled, pt is  compliant with medicines  - Hyperlipidimea, importance of hyperlipidimea discussed with pt. Ani Elizabeth has the following history recorded in care path:  Patient Active Problem List    Diagnosis Date Noted    Migraine equivalent syndrome 05/07/2015    Hypertension 05/07/2015    Obesity (BMI 30.0-34.9) 05/07/2015    Hyperglycemia 05/07/2015     Current Outpatient Medications   Medication Sig Dispense Refill    oxybutynin (DITROPAN-XL) 10 MG extended release tablet Take 10 mg by mouth daily      Gpxkfodurlhr-Xoficyl-Z-Probiot (AZO URINARY TRACT SUPPORT PO) Take 500 mg by mouth daily      Multiple Vitamin (MULTI-VITAMIN DAILY PO) Take by mouth      Cyanocobalamin (B-12 PO) Take 3,000 mcg by mouth daily      omeprazole (PRILOSEC) 20 MG delayed release capsule Take 1 capsule by mouth once daily 90 capsule 3    LORazepam (ATIVAN) 0.5 MG tablet Take 0.5 mg by mouth daily.  cetirizine (ZYRTEC) 10 MG tablet Take 1 tablet by mouth daily 90 tablet 1    butalbital-APAP-caffeine -40 MG CAPS per capsule Take 1 capsule by mouth every 6 hours as needed for Headaches 30 capsule 0    SUMAtriptan (IMITREX) 100 MG tablet Take 1 tablet by mouth as needed for Migraine 9 tablet 5    Biotin w/ Vitamins C & E (HAIR SKIN & NAILS GUMMIES PO) Take 2 each by mouth daily      Black Cohosh 40 MG CAPS Take 1 tablet by mouth daily       No current facility-administered medications for this visit.      Allergies: Vicodin [hydrocodone-acetaminophen], Penicillins, Sulfa antibiotics, and Hydrocodone-acetaminophen  Past Medical History:   Diagnosis Date    Hypertension     Migraines      Past Surgical History:   Procedure Laterality Date    CHOLECYSTECTOMY      HERNIA REPAIR      HYSTERECTOMY      NASAL SINUS SURGERY        As reviewed History reviewed. No pertinent family history. Social History     Tobacco Use    Smoking status: Former Smoker     Packs/day: 0.50     Years: 16.00     Pack years: 8.00     Types: Cigarettes     Start date:      Quit date: 2009     Years since quittin.8    Smokeless tobacco: Never Used   Substance Use Topics    Alcohol use: Yes     Alcohol/week: 0.0 standard drinks     Comment: On occasion. Caffine: 24 oz coffee daily       Review of Systems:    Constitutional: Negative for diaphoresis and fatigue  Psychological:Negative for anxiety or depression  HENT: Negative for headaches, nasal congestion, sinus pain or vertigo  Eyes: Negative for visual disturbance.    Endocrine: Negative for polydipsia/polyuria  Respiratory: Negative for shortness of breath  Cardiovascular: Negative for chest pain, dyspnea on exertion, claudication, edema, irregular heartbeat, murmur, palpitations or shortness of breath  Gastrointestinal: Negative for abdominal pain or heartburn  Genito-Urinary: Negative for urinary frequency/urgency  Musculoskeletal: Negative for muscle pain, muscular weakness, negative for pain in arm and leg or swelling in foot and leg  Neurological: Negative for dizziness, headaches, memory loss, numbness/tingling, visual changes, syncope  Dermatological: Negative for rash    Objective:    Vitals:    21 1416 21 1424   BP: (!) 170/114 (!) 180/116   Site: Right Upper Arm Right Upper Arm   Position: Sitting Sitting   Cuff Size: Medium Adult Medium Adult   Pulse: 74    Weight: 195 lb 9.6 oz (88.7 kg)    Height: 5' 4\" (1.626 m)      BP (!) 180/116 (Site: Right Upper Arm, Position: Sitting, Cuff Size: Medium Adult)   Pulse 74   Ht 5' 4\" (1.626 m)   Wt 195 lb 9.6 oz (88.7 kg)   BMI 33.57 kg/m²     No flowsheet data found. Wt Readings from Last 3 Encounters:   11/02/21 195 lb 9.6 oz (88.7 kg)   10/29/21 193 lb (87.5 kg)   05/11/20 182 lb (82.6 kg)     Body mass index is 33.57 kg/m². GENERAL - Alert, oriented, pleasant, in no apparent distress. EYES: No jaundice, no conjunctival pallor. SKIN: It is warm & dry. No rashes. No Echhymosis    HEENT  No clinically significant abnormalities seen. Neck - Supple. No jugular venous distention noted. No carotid bruits. Cardiovascular  Normal S1 and S2 without obvious murmur or gallop. Extremities - No cyanosis, clubbing, or significant edema. Pulmonary  No respiratory distress. No wheezes or rales. Abdomen  No masses, tenderness, or organomegaly. Musculoskeletal  No significant edema. No joint deformities. No muscle wasting. Neurologic  Cranial nerves II through XII are grossly intact. There were no gross focal neurologic abnormalities.     Lab Review   Lab Results   Component Value Date    CKTOTAL 101 01/21/2020    TROPONINT <0.010 01/21/2020     BNP:  No results found for: BNP  PT/INR:  No results found for: INR  Lab Results   Component Value Date    LABA1C 5.2 03/12/2019    LABA1C 5.8 04/23/2018     Lab Results   Component Value Date    WBC 7.1 10/29/2021    HCT 40.7 10/29/2021    MCV 86.1 10/29/2021     10/29/2021     Lab Results   Component Value Date    CHOL 209 (H) 10/29/2021    TRIG 114 10/29/2021    HDL 46 10/29/2021    LDLCALC 140 (H) 10/29/2021    LDLDIRECT 126 (H) 05/05/2011     Lab Results   Component Value Date    ALT 22 10/29/2021    AST 15 10/29/2021     BMP:    Lab Results   Component Value Date     10/29/2021    K 4.1 10/29/2021     10/29/2021    CO2 24 10/29/2021    BUN 16 10/29/2021    CREATININE 0.7 10/29/2021     CMP:   Lab Results   Component Value Date     10/29/2021    K 4.1 10/29/2021     10/29/2021    CO2 24 10/29/2021 BUN 16 10/29/2021    PROT 7.3 10/29/2021    PROT 7.1 05/05/2011     TSH:    Lab Results   Component Value Date    TSH 0.82 10/29/2021    TSHHS 0.953 06/20/2012           Assessment & Plan:    -  Hypertension: Patients blood pressure is abnormal. Patient is advised about low sodium diet. Present medical regimen will not be changed. add toprol 2 5 QD  Will help both  Had high BP during pregnancy    - tachycardia: Holter ETT and Echo    -  LIPID MANAGEMENT:  Importance of lipid levels discussed with patient   and patient was given dietary advice. NCEP- ATP III guidelines reviewed with patient. -   Changes  in medicines made: No                         Mortality from the morbid obesity is very high: Body mass index is 33.57 kg/m².                  Helena Bishop MD    [facility] [place]

## 2021-11-11 NOTE — RESULT ENCOUNTER NOTE
Recent lab results show that thyroid function is normal.  Vitamin B-12 is high and folate is acceptable. Vitamin D level is good at this time. Comprehensive metabolic panel is all normal.  Cholesterol profile is only fair with increased total cholesterol and increased LDL but probably not needing any medication adjustment or statin. Does keep working on healthy diet. CBC is all normal.    Not routed for staff call but released to my chart only.

## 2021-11-13 ASSESSMENT — ENCOUNTER SYMPTOMS
WHEEZING: 0
CHEST TIGHTNESS: 1
SHORTNESS OF BREATH: 0
ABDOMINAL PAIN: 0
BACK PAIN: 0
COUGH: 0

## 2021-11-17 ENCOUNTER — TELEPHONE (OUTPATIENT)
Dept: CARDIOLOGY CLINIC | Age: 41
End: 2021-11-17

## 2021-11-17 NOTE — TELEPHONE ENCOUNTER
Per Dr. Lawanda Patel, patient needs appt with Dr. Saba Travis for abn holter monitor. Message to EP team to schedule.

## 2021-11-24 ENCOUNTER — PROCEDURE VISIT (OUTPATIENT)
Dept: CARDIOLOGY CLINIC | Age: 41
End: 2021-11-24
Payer: COMMERCIAL

## 2021-11-24 VITALS
HEIGHT: 64 IN | RESPIRATION RATE: 20 BRPM | SYSTOLIC BLOOD PRESSURE: 160 MMHG | HEART RATE: 88 BPM | WEIGHT: 195 LBS | BODY MASS INDEX: 33.29 KG/M2 | DIASTOLIC BLOOD PRESSURE: 90 MMHG

## 2021-11-24 DIAGNOSIS — R07.9 CHEST PAIN, UNSPECIFIED TYPE: ICD-10-CM

## 2021-11-24 DIAGNOSIS — R00.0 TACHYCARDIA: ICD-10-CM

## 2021-11-24 DIAGNOSIS — R42 DIZZINESS, NONSPECIFIC: ICD-10-CM

## 2021-11-24 LAB
LV EF: 58 %
LVEF MODALITY: NORMAL

## 2021-11-24 PROCEDURE — 93306 TTE W/DOPPLER COMPLETE: CPT | Performed by: INTERNAL MEDICINE

## 2021-11-24 PROCEDURE — 93015 CV STRESS TEST SUPVJ I&R: CPT | Performed by: INTERNAL MEDICINE

## 2021-12-01 ENCOUNTER — TELEPHONE (OUTPATIENT)
Dept: CARDIOLOGY CLINIC | Age: 41
End: 2021-12-01

## 2021-12-08 ENCOUNTER — OFFICE VISIT (OUTPATIENT)
Dept: CARDIOLOGY CLINIC | Age: 41
End: 2021-12-08
Payer: COMMERCIAL

## 2021-12-08 VITALS
BODY MASS INDEX: 33.63 KG/M2 | HEIGHT: 64 IN | WEIGHT: 197 LBS | SYSTOLIC BLOOD PRESSURE: 130 MMHG | DIASTOLIC BLOOD PRESSURE: 76 MMHG | HEART RATE: 76 BPM

## 2021-12-08 DIAGNOSIS — I10 PRIMARY HYPERTENSION: Primary | ICD-10-CM

## 2021-12-08 DIAGNOSIS — I44.7 LEFT BUNDLE BRANCH BLOCK: ICD-10-CM

## 2021-12-08 DIAGNOSIS — R07.9 CHEST PAIN, UNSPECIFIED TYPE: ICD-10-CM

## 2021-12-08 PROCEDURE — 99214 OFFICE O/P EST MOD 30 MIN: CPT | Performed by: NURSE PRACTITIONER

## 2021-12-08 RX ORDER — METOPROLOL SUCCINATE 50 MG/1
50 TABLET, EXTENDED RELEASE ORAL DAILY
Qty: 30 TABLET | Refills: 5 | Status: SHIPPED | OUTPATIENT
Start: 2021-12-08 | End: 2022-06-23

## 2021-12-08 ASSESSMENT — ENCOUNTER SYMPTOMS
ORTHOPNEA: 0
SHORTNESS OF BREATH: 1

## 2021-12-08 NOTE — LETTER
Horald Ahumada Dr. Simeon Sallies  1980  [id number]    Have you had any Chest Pain that is not new? - No  If Yes DO EKG - How does it feel -    How long does the pain last -      How long have you been having the pain -    Did you take a    And did it relieve the pain -      DO EKG IF: Patient has a Heart Rate above 100 or below 40     CAD (Coronary Artery Disease) patient should have one on file every 6 months        Have you had any Shortness of Breath - No  If Yes - When     Have you had any dizziness - No  If Yes DO ORTHOSTATIC BP - when do you feel dizzy    How long does it last .        Sitting wait 5 minutes do supine (laying down) wait 5 minutes then do standing - log each in \"vitals\" area in Epic   Be sure to ask what symptoms they are having if they get dizzy while completing ortho stats such as: room spinning, nausea, etc.    Have you had any palpitations that are not new? - No  If Yes DO EKG - Do you feel your heart   How long does it last - . Is the patient on any of the following medications - Dofetilide (Tikosyn)  If Yes DO EKG - Needs done every 6 months    Do you have any edema - swelling in No    If Yes - CHECK TO SEE IF THE EDEMA IS PITTING  How long have they been having edema -   If Yes - Have they worn compression stockings   If they have worn compression stockings      Vein \"LEG PROBLEM Questionnaire\"  1. Do you have prominent leg veins? No   2. Do you have any skin discoloration? No  3. Do you have any healed or active sores? No  4. Do you have swelling of the legs? No  5. Do you have a family history of varicose veins? No  6. Does your profession involve pro-longed        standing or heavy lifting? No  7. Have you been fighting overweight problems? Yes  8. Do you have restless legs? Yes  9. Do you have any night time cramps? Yes  10.  Do you have any of the following in your legs:        Aching     When did you have your last labs drawn 10/29/2021  Where did you have them done   What doctor ordered     If we do not have these labs you are retrieve these labs for these providers! Do you have a surgery or procedure scheduled in the near future - No  If Yes- DO EKG  If Yes - Who is the surgery with?    Phone number of physician   Fax number of physician   Type of surgery   GIVE THIS INFORMATION TO CATHLEEN CRAIG     Ask patient if they want to sign up for [employer] if they are not already signed up     Check to see if we have an E-MAIL on file for the patient     Check medication list thoroughly!!! AND RECONCILE OUTSIDE MEDICATIONS  If dose has changed change the entire order not just the [place] At check out add to every patient's \"wrap up\" the following dot phrase AFTERHOURSEDUCATION and ensure we explain this to our patients

## 2021-12-08 NOTE — PROGRESS NOTES
12/8/2021  Primary cardiologist: Dr. Lundberg Degree  is an established 35 y.o.  female here for follow-up on on recent testing: holter/ stress test  Chest pain /palpitations     SUBJECTIVE/OBJECTIVE:    HPI : Franck Duarte" is a pleasant 49-year-old patient with a history of chest pain, palpitation, tachycardia and hypertension. She is a registered nurse. She was initially seen November 2 for concerns of tachycardia and high blood pressure. She underwent noninvasive testing with an exercise treadmill test echocardiogram and Holter monitor. 11/24/2021: Echocardiogram demonstrated normal left ventricular systolic function with EF 55 to 60%. No significant valvular heart disease was appreciated    11/24/2021: Exercise treadmill stress test showed near maximal study negative for angina however ECG is nondiagnostic due to left bundle branch block. She exercised for 7.1 minutes on Ever protocol    11/10/2021: 48-hour Holter monitor concerning for related left bundle branch block-patient did have symptoms while wearing monitor however no significant bradycardias or pauses  were appreciated. Sheryl Vinson continues to note chest pain. States is lasting for a few minutes goes away on its own. Pain can radiate to her clavicle and at one time radiated to her jaw. She also notes shortness of breath with exertion and at rest.  She notes palpitations which she describes as a fluttering sensation to a pounding sensation that will go away on their own. Notes with doing daily activities such as giving herself a shower she will be tachycardic with heart rate in the 120s. Review of Systems   Constitutional: Negative for diaphoresis and malaise/fatigue. Cardiovascular: Positive for chest pain, dyspnea on exertion and palpitations. Negative for claudication, irregular heartbeat, leg swelling, near-syncope, orthopnea and paroxysmal nocturnal dyspnea. Respiratory: Positive for shortness of breath. Neurological: Negative for dizziness and light-headedness. Vitals:    12/08/21 0825   BP: 130/76   Site: Left Upper Arm   Position: Sitting   Cuff Size: Medium Adult   Pulse: 76   Weight: 197 lb (89.4 kg)   Height: 5' 4\" (1.626 m)     No flowsheet data found. Wt Readings from Last 3 Encounters:   12/08/21 197 lb (89.4 kg)   11/24/21 195 lb (88.5 kg)   11/02/21 195 lb 9.6 oz (88.7 kg)     Body mass index is 33.81 kg/m². Physical Exam  Vitals reviewed. Constitutional:       Appearance: Normal appearance. Cardiovascular:      Rate and Rhythm: Normal rate and regular rhythm. Heart sounds: Normal heart sounds. Pulmonary:      Effort: Pulmonary effort is normal.      Breath sounds: Normal breath sounds. Musculoskeletal:      Left lower leg: No edema. Skin:     General: Skin is warm and dry. Capillary Refill: Capillary refill takes less than 2 seconds. Neurological:      Mental Status: She is alert and oriented to person, place, and time. Psychiatric:         Behavior: Behavior normal.                Current Outpatient Medications   Medication Sig Dispense Refill    metoprolol succinate (TOPROL XL) 25 MG extended release tablet Take 1 tablet by mouth daily 30 tablet 3    oxybutynin (DITROPAN-XL) 10 MG extended release tablet Take 10 mg by mouth daily      Ejgbwwnghnid-Drajlxz-L-Probiot (AZO URINARY TRACT SUPPORT PO) Take 500 mg by mouth daily      Multiple Vitamin (MULTI-VITAMIN DAILY PO) Take by mouth      Cyanocobalamin (B-12 PO) Take 3,000 mcg by mouth daily      omeprazole (PRILOSEC) 20 MG delayed release capsule Take 1 capsule by mouth once daily 90 capsule 3    LORazepam (ATIVAN) 0.5 MG tablet Take 0.5 mg by mouth daily.       cetirizine (ZYRTEC) 10 MG tablet Take 1 tablet by mouth daily 90 tablet 1    butalbital-APAP-caffeine -40 MG CAPS per capsule Take 1 capsule by mouth every 6 hours as needed for Headaches 30 capsule 0    SUMAtriptan (IMITREX) 100 MG tablet Take 1 tablet by mouth as needed for Migraine 9 tablet 5    Biotin w/ Vitamins C & E (HAIR SKIN & NAILS GUMMIES PO) Take 2 each by mouth daily      Black Cohosh 40 MG CAPS Take 1 tablet by mouth daily       No current facility-administered medications for this visit. All pertinent data reviewed and discussed with patient       ASSESSMENT/PLAN:   LBBB  ? If rate related   Continue with ongoing palpitations Holter monitor with heart rate up to 140s will increase Toprol to 50 mg daily    Chest pain  Exercise treadmill nondiagnostic as patient had left bundle branch block. Recommend check Lexiscan    Shortness of breath with exertion  Intermittent episodes of shortness of breath with exertion and at rest.  Underwent exercise treadmill however was nondiagnostic due to left bundle branch block: Recommend check Lexiscan    Medications reviewed and confirmed with patient     Tests ordered:  ileana       Follow-up  6 months      Signed:  JORGE See CNP, 12/8/2021, 8:43 AM    An electronic signature was used to authenticate this note. Please note this report has been partially produced using speech recognition software and may contain errors related to that system including errors in grammar, punctuation, and spelling, as well as words and phrases that may be inappropriate. If there are any questions or concerns please feel free to contact the dictating provider for clarification.

## 2021-12-08 NOTE — PATIENT INSTRUCTIONS
you.   The physician will specify if the following Beta blockers need to be held for 24 hours prior to test: Toprol XL (metoprolol ER)

## 2021-12-20 ENCOUNTER — TELEPHONE (OUTPATIENT)
Dept: CARDIOLOGY CLINIC | Age: 41
End: 2021-12-20

## 2022-01-03 ENCOUNTER — PROCEDURE VISIT (OUTPATIENT)
Dept: CARDIOLOGY CLINIC | Age: 42
End: 2022-01-03
Payer: COMMERCIAL

## 2022-01-03 DIAGNOSIS — I44.7 LEFT BUNDLE BRANCH BLOCK: ICD-10-CM

## 2022-01-03 DIAGNOSIS — R06.02 SOB (SHORTNESS OF BREATH): ICD-10-CM

## 2022-01-03 DIAGNOSIS — R07.9 CHEST PAIN, UNSPECIFIED TYPE: Primary | ICD-10-CM

## 2022-01-03 DIAGNOSIS — I10 PRIMARY HYPERTENSION: ICD-10-CM

## 2022-01-03 LAB
LV EF: 61 %
LVEF MODALITY: NORMAL

## 2022-01-03 PROCEDURE — A9500 TC99M SESTAMIBI: HCPCS | Performed by: INTERNAL MEDICINE

## 2022-01-03 PROCEDURE — 93015 CV STRESS TEST SUPVJ I&R: CPT | Performed by: INTERNAL MEDICINE

## 2022-01-03 PROCEDURE — 78452 HT MUSCLE IMAGE SPECT MULT: CPT | Performed by: INTERNAL MEDICINE

## 2022-01-05 ENCOUNTER — TELEPHONE (OUTPATIENT)
Dept: CARDIOLOGY CLINIC | Age: 42
End: 2022-01-05

## 2022-01-05 NOTE — TELEPHONE ENCOUNTER
Appointment set up for Jan 10th at 10:30 AM in [place] with Dr. Lance Reno to discuss Ab NM only time patient could do.

## 2022-01-05 NOTE — TELEPHONE ENCOUNTER
----- Message from Xiomara Celaya sent at 1/5/2022  3:48 PM EST -----    ----- Message -----  From: JORGE Bishop - FROY  Sent: 1/5/2022   1:24 PM EST  To: Wilson Campbell MA    Please phone results of stress test- abnormal - recommend follow up with Dr Edgar Pardo to discuss

## 2022-01-13 ENCOUNTER — TELEPHONE (OUTPATIENT)
Dept: CARDIOLOGY CLINIC | Age: 42
End: 2022-01-13

## 2022-01-13 ENCOUNTER — OFFICE VISIT (OUTPATIENT)
Dept: CARDIOLOGY CLINIC | Age: 42
End: 2022-01-13
Payer: COMMERCIAL

## 2022-01-13 VITALS
HEART RATE: 88 BPM | HEIGHT: 64 IN | WEIGHT: 200.4 LBS | BODY MASS INDEX: 34.21 KG/M2 | SYSTOLIC BLOOD PRESSURE: 120 MMHG | DIASTOLIC BLOOD PRESSURE: 86 MMHG

## 2022-01-13 DIAGNOSIS — R07.2 PRECORDIAL CHEST PAIN: Primary | ICD-10-CM

## 2022-01-13 PROCEDURE — 99214 OFFICE O/P EST MOD 30 MIN: CPT | Performed by: INTERNAL MEDICINE

## 2022-01-13 NOTE — TELEPHONE ENCOUNTER
I called the patient and let her know that we have her CTA cardiac scheduled and it's going to be at [facility] on 2/8/2021 arrival time 7:30 am and scan time 8:00 am .   NPO @ midnight the night before .  The patient stated that she will be there for this appointment

## 2022-01-13 NOTE — PROGRESS NOTES
CARDIOLOGY NOTE      1/13/2022    RE: Gee Waters  (1980)                               TO:  Dr. Jorge Boston MD            CHIEF Otoniel Cardozo is a 35 y.o. female who was seen today for management of  palpitations                                    HPI:                   Pt has h/o palpittaions, seen today for  fu. Pt has  freq palpitations, has freq CP has ? Stress test    Gee Waters has the following history recorded in care path:  Patient Active Problem List    Diagnosis Date Noted    Left bundle branch block 12/08/2021    Migraine equivalent syndrome 05/07/2015    Hypertension 05/07/2015    Obesity (BMI 30.0-34.9) 05/07/2015    Hyperglycemia 05/07/2015     Current Outpatient Medications   Medication Sig Dispense Refill    metoprolol succinate (TOPROL XL) 50 MG extended release tablet Take 1 tablet by mouth daily 30 tablet 5    oxybutynin (DITROPAN-XL) 10 MG extended release tablet Take 10 mg by mouth daily      Xwfjicnwzaom-Uvzkyih-H-Probiot (AZO URINARY TRACT SUPPORT PO) Take 500 mg by mouth daily      Multiple Vitamin (MULTI-VITAMIN DAILY PO) Take by mouth      Cyanocobalamin (B-12 PO) Take 3,000 mcg by mouth daily      omeprazole (PRILOSEC) 20 MG delayed release capsule Take 1 capsule by mouth once daily 90 capsule 3    LORazepam (ATIVAN) 0.5 MG tablet Take 0.5 mg by mouth daily.  cetirizine (ZYRTEC) 10 MG tablet Take 1 tablet by mouth daily 90 tablet 1    butalbital-APAP-caffeine -40 MG CAPS per capsule Take 1 capsule by mouth every 6 hours as needed for Headaches 30 capsule 0    SUMAtriptan (IMITREX) 100 MG tablet Take 1 tablet by mouth as needed for Migraine 9 tablet 5    Biotin w/ Vitamins C & E (HAIR SKIN & NAILS GUMMIES PO) Take 2 each by mouth daily      Black Cohosh 40 MG CAPS Take 1 tablet by mouth daily       No current facility-administered medications for this visit.      Allergies: Vicodin [hydrocodone-acetaminophen], Penicillins, Sulfa antibiotics, and Hydrocodone-acetaminophen  Past Medical History:   Diagnosis Date    Hypertension     Migraines      Past Surgical History:   Procedure Laterality Date    CHOLECYSTECTOMY      HERNIA REPAIR      HYSTERECTOMY      NASAL SINUS SURGERY        As reviewed History reviewed. No pertinent family history. Social History     Tobacco Use    Smoking status: Former Smoker     Packs/day: 0.50     Years: 16.00     Pack years: 8.00     Types: Cigarettes     Start date:      Quit date:      Years since quittin.0    Smokeless tobacco: Never Used   Substance Use Topics    Alcohol use: Yes     Alcohol/week: 0.0 standard drinks     Comment: On occasion. Caffine: 24 oz coffee daily       Review of Systems:    Constitutional: Negative for diaphoresis and fatigue  Psychological:Negative for anxiety or depression  HENT: Negative for headaches, nasal congestion, sinus pain or vertigo  Eyes: Negative for visual disturbance. Endocrine: Negative for polydipsia/polyuria  Respiratory: Negative for shortness of breath  Cardiovascular: Negative for chest pain, dyspnea on exertion, claudication, edema, irregular heartbeat, murmur, palpitations or shortness of breath  Gastrointestinal: Negative for abdominal pain or heartburn  Genito-Urinary: Negative for urinary frequency/urgency  Musculoskeletal: Negative for muscle pain, muscular weakness, negative for pain in arm and leg or swelling in foot and leg  Neurological: Negative for dizziness, headaches, memory loss, numbness/tingling, visual changes, syncope  Dermatological: Negative for rash    Objective:    Vitals:    22 0800   BP: 120/86   Pulse: 88   Weight: 200 lb 6.4 oz (90.9 kg)   Height: 5' 4\" (1.626 m)     /86   Pulse 88   Ht 5' 4\" (1.626 m)   Wt 200 lb 6.4 oz (90.9 kg)   BMI 34.40 kg/m²     No flowsheet data found.      Wt Readings from Last 3 Encounters:   22 200 lb 6.4 oz (90.9 kg)   21 197 lb (89.4 kg) 11/24/21 195 lb (88.5 kg)     Body mass index is 34.4 kg/m². GENERAL - Alert, oriented, pleasant, in no apparent distress. EYES: No jaundice, no conjunctival pallor. SKIN: It is warm & dry. No rashes. No Echhymosis    HEENT  No clinically significant abnormalities seen. Neck - Supple. No jugular venous distention noted. No carotid bruits. Cardiovascular  Normal S1 and S2 without obvious murmur or gallop. Extremities - No cyanosis, clubbing, or significant edema. Pulmonary  No respiratory distress. No wheezes or rales. Abdomen  No masses, tenderness, or organomegaly. Musculoskeletal  No significant edema. No joint deformities. No muscle wasting. Neurologic  Cranial nerves II through XII are grossly intact. There were no gross focal neurologic abnormalities.     Lab Review   Lab Results   Component Value Date    CKTOTAL 101 01/21/2020    TROPONINT <0.010 01/21/2020     BNP:  No results found for: BNP  PT/INR:  No results found for: INR  Lab Results   Component Value Date    LABA1C 5.2 03/12/2019    LABA1C 5.8 04/23/2018     Lab Results   Component Value Date    WBC 7.1 10/29/2021    HCT 40.7 10/29/2021    MCV 86.1 10/29/2021     10/29/2021     Lab Results   Component Value Date    CHOL 209 (H) 10/29/2021    TRIG 114 10/29/2021    HDL 46 10/29/2021    LDLCALC 140 (H) 10/29/2021    LDLDIRECT 126 (H) 05/05/2011     Lab Results   Component Value Date    ALT 22 10/29/2021    AST 15 10/29/2021     BMP:    Lab Results   Component Value Date     10/29/2021    K 4.1 10/29/2021     10/29/2021    CO2 24 10/29/2021    BUN 16 10/29/2021    CREATININE 0.7 10/29/2021     CMP:   Lab Results   Component Value Date     10/29/2021    K 4.1 10/29/2021     10/29/2021    CO2 24 10/29/2021    BUN 16 10/29/2021    PROT 7.3 10/29/2021    PROT 7.1 05/05/2011     TSH:    Lab Results   Component Value Date    TSH 0.82 10/29/2021    TSHHS 0.953 06/20/2012           Assessment & Plan:    - Hypertension: Patients blood pressure is normal. Patient is advised about low sodium diet. Present medical regimen will not be changed. On metoprolol    -  LIPID MANAGEMENT:  Importance of lipid levels discussed with patient   and patient was given dietary advice. NCEP- ATP III guidelines reviewed with patient.     -   Changes  in medicines made: No    - LBBB                         Conclusions       [facility] Parisi physician Dr. Radha Aldridge .   Bernal Mass stress test, normal LVEF, the observed deferc on perfusion scan shows    small size, moderate intensity, partially reversible perfusion defect in    anterospetal wall noted which could be breast tissue attenuation and LBBB    artifact.        Recommendation    clinical correlation recommended        Signatures        ------------------------------------------------------------------    Electronically signed by Jose M Ann MD   [id number] [facility] cardiologist) on 01/05/2022 at 12:49       - CTA chest for CAD                             Juan Becerra MD    [address] [facility]

## 2022-02-02 ENCOUNTER — INITIAL CONSULT (OUTPATIENT)
Dept: CARDIOLOGY CLINIC | Age: 42
End: 2022-02-02
Payer: COMMERCIAL

## 2022-02-02 VITALS
HEART RATE: 80 BPM | HEIGHT: 64 IN | SYSTOLIC BLOOD PRESSURE: 132 MMHG | DIASTOLIC BLOOD PRESSURE: 88 MMHG | BODY MASS INDEX: 34.15 KG/M2 | WEIGHT: 200 LBS

## 2022-02-02 DIAGNOSIS — R00.2 PALPITATIONS: Primary | ICD-10-CM

## 2022-02-02 DIAGNOSIS — R06.09 DYSPNEA ON EXERTION: ICD-10-CM

## 2022-02-02 PROCEDURE — 99203 OFFICE O/P NEW LOW 30 MIN: CPT | Performed by: INTERNAL MEDICINE

## 2022-02-02 PROCEDURE — 93000 ELECTROCARDIOGRAM COMPLETE: CPT | Performed by: INTERNAL MEDICINE

## 2022-02-02 NOTE — PROGRESS NOTES
Electrophysiology Consult Note      Reason for consultation: Palpitations    Chief complaint : Palpitations    Referring physician: Damian Herring      Primary care physician: Coty Bernstein MD      History of Present Illness:     Patient is a 24-year-old female with history of hypertension, obesity referred by Dr. Mekhi Kimball for palpitations and question of abnormal monitor. Patient reports she has on and off palpitations sometimes with exertion sometimes at rest.  Patient reports shortness of breath with exertion. Patient does drink caffeine and alcohol. Patient does not smoke. Patient does not exercise    Patient thinks her shortness of breath has started since her Covid infection    Pastmedical history:   Past Medical History:   Diagnosis Date    Hypertension     Migraines        Surgical history :   Past Surgical History:   Procedure Laterality Date    CHOLECYSTECTOMY  20014    HERNIA REPAIR      HYSTERECTOMY      NASAL SINUS SURGERY  1991       Family history: History reviewed. No pertinent family history. Social history :  reports that she quit smoking about 13 years ago. Her smoking use included cigarettes. She started smoking about 29 years ago. She has a 8.00 pack-year smoking history. She has never used smokeless tobacco. She reports current alcohol use. She reports that she does not use drugs. Allergies   Allergen Reactions    Vicodin [Hydrocodone-Acetaminophen]      Makes patient nauseated.  Penicillins Other (See Comments)     Family allergy, never given  Mom has never had but strong family reaction.      Sulfa Antibiotics Other (See Comments)     Family allergy, never given    Hydrocodone-Acetaminophen Nausea And Vomiting       Current Outpatient Medications on File Prior to Visit   Medication Sig Dispense Refill    metoprolol succinate (TOPROL XL) 50 MG extended release tablet Take 1 tablet by mouth daily 30 tablet 5    oxybutynin (DITROPAN-XL) 10 MG extended release tablet Take 10 mg by mouth daily      Kwezuxvdkwhq-Wrsbuld-Y-Probiot (AZO URINARY TRACT SUPPORT PO) Take 500 mg by mouth daily      Multiple Vitamin (MULTI-VITAMIN DAILY PO) Take by mouth      Cyanocobalamin (B-12 PO) Take 3,000 mcg by mouth daily      omeprazole (PRILOSEC) 20 MG delayed release capsule Take 1 capsule by mouth once daily 90 capsule 3    LORazepam (ATIVAN) 0.5 MG tablet Take 0.5 mg by mouth daily.  cetirizine (ZYRTEC) 10 MG tablet Take 1 tablet by mouth daily 90 tablet 1    butalbital-APAP-caffeine -40 MG CAPS per capsule Take 1 capsule by mouth every 6 hours as needed for Headaches 30 capsule 0    SUMAtriptan (IMITREX) 100 MG tablet Take 1 tablet by mouth as needed for Migraine 9 tablet 5    Biotin w/ Vitamins C & E (HAIR SKIN & NAILS GUMMIES PO) Take 2 each by mouth daily      Black Cohosh 40 MG CAPS Take 1 tablet by mouth daily       No current facility-administered medications on file prior to visit. Review of Systems:   Review of Systems   Constitutional: Negative for activity change, chills, fatigue and fever. HENT: Negative for congestion, ear pain and tinnitus. Eyes: Negative for photophobia, pain and visual disturbance. Respiratory: Positive for shortness of breath. Negative for cough, chest tightness and wheezing. Cardiovascular: Positive for palpitations. Negative for chest pain and leg swelling. Gastrointestinal: Negative for abdominal pain, blood in stool, constipation, diarrhea, nausea and vomiting. Endocrine: Negative for cold intolerance and heat intolerance. Genitourinary: Negative for dysuria, flank pain and hematuria. Musculoskeletal: Negative for arthralgias, back pain, myalgias and neck stiffness. Skin: Negative for color change and rash. Allergic/Immunologic: Negative for food allergies. Neurological: Negative for dizziness, light-headedness, numbness and headaches.    Hematological: Does not bruise/bleed easily. Psychiatric/Behavioral: Negative for agitation, behavioral problems and confusion. Examination:      Vitals:    02/02/22 1612   BP: 132/88   Pulse: 80   Weight: 200 lb (90.7 kg)   Height: 5' 4\" (1.626 m)        Body mass index is 34.33 kg/m². Physical Exam  Constitutional:       Appearance: Normal appearance. She is not ill-appearing. HENT:      Head: Normocephalic and atraumatic. Mouth/Throat:      Mouth: Mucous membranes are moist.   Eyes:      Conjunctiva/sclera: Conjunctivae normal.   Cardiovascular:      Rate and Rhythm: Normal rate and regular rhythm. Heart sounds: No murmur heard. Pulmonary:      Effort: Pulmonary effort is normal.      Breath sounds: No rales. Abdominal:      General: Abdomen is flat. Palpations: Abdomen is soft. Musculoskeletal:         General: No tenderness. Normal range of motion. Cervical back: Normal range of motion. Right lower leg: No edema. Left lower leg: No edema. Skin:     General: Skin is warm and dry. Neurological:      General: No focal deficit present. Mental Status: She is alert and oriented to person, place, and time.                CBC:   Lab Results   Component Value Date    WBC 7.1 10/29/2021    HGB 13.7 10/29/2021    HCT 40.7 10/29/2021     10/29/2021     Lipids:  Lab Results   Component Value Date    CHOL 209 (H) 10/29/2021    TRIG 114 10/29/2021    HDL 46 10/29/2021    LDLCALC 140 (H) 10/29/2021    LDLDIRECT 126 (H) 05/05/2011     PT/INR: No results found for: INR     BMP:    Lab Results   Component Value Date     10/29/2021    K 4.1 10/29/2021     10/29/2021    CO2 24 10/29/2021    BUN 16 10/29/2021     CMP:   Lab Results   Component Value Date    AST 15 10/29/2021    PROT 7.3 10/29/2021    BILITOT 0.3 10/29/2021    ALKPHOS 109 10/29/2021     TSH:    Lab Results   Component Value Date    TSH 0.82 10/29/2021       EKGINTERPRETATION - EKG Interpretation:   Sinus rhythm      IMPRESSION / RECOMMENDATIONS:       Palpitations  Rate dependent Bundle branch block  History of migraine on medicatins  History of COVID 19    Patient has rate related bundle branch delay and I do not see any significant arrhythmia on the patient. Patient stress test shows she has heart rate responds appropriately. Patient describes shortness of breath and with her Covid history in the past I would like to get at least PFTs. Patient is also getting a CT scan to assess for coronary disease but I think she may need a dedicated lung CT has her shortness of breath appears to be more related to the lungs rather than the cardiac in nature    Had a detailed discussion with the patient and addressed all her questions      Thanks again for allowing me to participate in care of this patient. Please call me if you have any questions. With best regards. Evelin Adkins MD, 2/2/2022 4:18 PM     Please note this report has been partially produced using speech recognition software and may contain errors related to that system including errors in grammar, punctuation, and spelling, as well as words and phrases that may be inappropriate. If there are any questions or concerns please feel free to contact the dictating provider for clarification.

## 2022-02-02 NOTE — PATIENT INSTRUCTIONS
Please be informed that if you contact our office outside of normal business hours the physician on call cannot help with any scheduling or rescheduling issues, procedure instruction questions or any type of medication issue. We advise you for any urgent/emergency that you go to the nearest emergency room! PLEASE CALL OUR OFFICE DURING NORMAL BUSINESS HOURS    Monday - Friday   8 am to 5 pm    [place]: [address]: [phone]    [place]:  [phone]    **It is YOUR responsibilty to bring medication bottles and/or updated medication list to [address] [place] [address].  This will allow us to better serve you and all your healthcare needs**

## 2022-02-03 ENCOUNTER — TELEPHONE (OUTPATIENT)
Dept: CARDIOLOGY CLINIC | Age: 42
End: 2022-02-03

## 2022-02-03 NOTE — TELEPHONE ENCOUNTER
Called and informed patient the date and time of  COVID Screening and PFT at [facility]. Covid appointment on 2/24/22 at 10:10am and PFT scheduled 3/1/22 at 3PM.  If patient unable to keep appointments to call central scheduling to reschedule at [phone]. PULMONARY FUNCTION TEST  PREPS:               * No breathing medications including inhalers 12 hours prior to the procedure. (If unable to withhold medication please notate the name of the medication and time taken in the appointment notes.)               * No perfume, after shave, lotions, or strong scents the day of the procedure. * No smoking 4 hours prior           * No alcohol or caffeinated drinks          * Refrain from vigorous exercise          * Avoid cold air exposure          * Avoid eating large meals prior to the procedure          * Wear loose comfortable clothing               * Patient needs to arrive 30 minutes prior               * Results will be sent to the ordering physician approximately 1 week from the procedure.

## 2022-02-08 ENCOUNTER — HOSPITAL ENCOUNTER (OUTPATIENT)
Dept: CT IMAGING | Age: 42
Discharge: HOME | End: 2022-02-08
Payer: COMMERCIAL

## 2022-02-08 DIAGNOSIS — R07.2 PRECORDIAL CHEST PAIN: ICD-10-CM

## 2022-02-08 PROCEDURE — 6360000004 HC RX CONTRAST MEDICATION: Performed by: INTERNAL MEDICINE

## 2022-02-08 PROCEDURE — 75574 CT ANGIO HRT W/3D IMAGE: CPT

## 2022-02-08 PROCEDURE — 75574 CT ANGIO HRT W/3D IMAGE: CPT | Performed by: INTERNAL MEDICINE

## 2022-02-08 RX ADMIN — IOPAMIDOL 120 ML: 755 INJECTION, SOLUTION INTRAVENOUS at 08:50

## 2022-02-09 ENCOUNTER — OFFICE VISIT (OUTPATIENT)
Dept: CARDIOLOGY CLINIC | Age: 42
End: 2022-02-09
Payer: COMMERCIAL

## 2022-02-09 VITALS
DIASTOLIC BLOOD PRESSURE: 82 MMHG | SYSTOLIC BLOOD PRESSURE: 126 MMHG | HEIGHT: 64 IN | BODY MASS INDEX: 33.9 KG/M2 | WEIGHT: 198.6 LBS | OXYGEN SATURATION: 97 % | HEART RATE: 74 BPM

## 2022-02-09 DIAGNOSIS — I44.7 LEFT BUNDLE BRANCH BLOCK: Primary | ICD-10-CM

## 2022-02-09 PROCEDURE — 99214 OFFICE O/P EST MOD 30 MIN: CPT | Performed by: INTERNAL MEDICINE

## 2022-02-09 NOTE — PROGRESS NOTES
CARDIOLOGY NOTE      2/9/2022    RE: Ender Darrius  (1980)                               TO:  Dr. Rosalva Che MD            CHIEF Erica Garcia is a 38 y.o. female who was seen today for management of abnormal EKG                                    HPI:                   Pt has h/o palpitations, abnormal EKG, left bundle branch, obesity block seen today for follow-up. Pt has had a CTA coronary performed which showed no significant coronary artery disease  Saw EP will be getting PFTs    Ender Rizo has the following history recorded in care path:  Patient Active Problem List    Diagnosis Date Noted    Left bundle branch block 12/08/2021    Migraine equivalent syndrome 05/07/2015    Hypertension 05/07/2015    Obesity (BMI 30.0-34.9) 05/07/2015    Hyperglycemia 05/07/2015     Current Outpatient Medications   Medication Sig Dispense Refill    metoprolol succinate (TOPROL XL) 50 MG extended release tablet Take 1 tablet by mouth daily 30 tablet 5    oxybutynin (DITROPAN-XL) 10 MG extended release tablet Take 10 mg by mouth daily      Vqagdllnyjrw-Wjybkaz-F-Probiot (AZO URINARY TRACT SUPPORT PO) Take 500 mg by mouth daily      Multiple Vitamin (MULTI-VITAMIN DAILY PO) Take by mouth      Cyanocobalamin (B-12 PO) Take 3,000 mcg by mouth daily      omeprazole (PRILOSEC) 20 MG delayed release capsule Take 1 capsule by mouth once daily 90 capsule 3    LORazepam (ATIVAN) 0.5 MG tablet Take 0.5 mg by mouth daily.       cetirizine (ZYRTEC) 10 MG tablet Take 1 tablet by mouth daily 90 tablet 1    butalbital-APAP-caffeine -40 MG CAPS per capsule Take 1 capsule by mouth every 6 hours as needed for Headaches 30 capsule 0    SUMAtriptan (IMITREX) 100 MG tablet Take 1 tablet by mouth as needed for Migraine 9 tablet 5    Biotin w/ Vitamins C & E (HAIR SKIN & NAILS GUMMIES PO) Take 2 each by mouth daily      Black Cohosh 40 MG CAPS Take 1 tablet by mouth daily       No current facility-administered medications for this visit. Allergies: Vicodin [hydrocodone-acetaminophen], Penicillins, Sulfa antibiotics, and Hydrocodone-acetaminophen  Past Medical History:   Diagnosis Date    Hypertension     Migraines      Past Surgical History:   Procedure Laterality Date    CHOLECYSTECTOMY      HERNIA REPAIR      HYSTERECTOMY      NASAL SINUS SURGERY        As reviewed History reviewed. No pertinent family history. Social History     Tobacco Use    Smoking status: Former Smoker     Packs/day: 0.50     Years: 16.00     Pack years: 8.00     Types: Cigarettes     Start date:      Quit date:      Years since quittin.1    Smokeless tobacco: Never Used   Substance Use Topics    Alcohol use: Yes     Alcohol/week: 0.0 standard drinks     Comment: On occasion. Caffine: 24 oz coffee daily         Objective:    Vitals:    22 0745   BP: 126/82   Pulse: 74   SpO2: 97%   Weight: 198 lb 9.6 oz (90.1 kg)   Height: 5' 4\" (1.626 m)     /82   Pulse 74   Ht 5' 4\" (1.626 m)   Wt 198 lb 9.6 oz (90.1 kg)   SpO2 97%   BMI 34.09 kg/m²     No flowsheet data found. Wt Readings from Last 3 Encounters:   22 198 lb 9.6 oz (90.1 kg)   22 200 lb (90.7 kg)   22 200 lb 6.4 oz (90.9 kg)     Body mass index is 34.09 kg/m². GENERAL - Alert, oriented, pleasant, in no apparent distress. EYES: No jaundice, no conjunctival pallor. SKIN: It is warm & dry. No rashes. No Echhymosis    HEENT  No clinically significant abnormalities seen. Neck - Supple. No jugular venous distention noted. No carotid bruits. Cardiovascular  Normal S1 and S2 without obvious murmur or gallop. Extremities - No cyanosis, clubbing, or significant edema. Pulmonary  No respiratory distress. No wheezes or rales. Abdomen  No masses, tenderness, or organomegaly. Musculoskeletal  No significant edema. No joint deformities. No muscle wasting.   Neurologic  Cranial nerves II through XII are grossly intact. There were no gross focal neurologic abnormalities.     Lab Review   Lab Results   Component Value Date    CKTOTAL 101 01/21/2020    TROPONINT <0.010 01/21/2020     BNP:  No results found for: BNP  PT/INR:  No results found for: INR  Lab Results   Component Value Date    LABA1C 5.2 03/12/2019    LABA1C 5.8 04/23/2018     Lab Results   Component Value Date    WBC 7.1 10/29/2021    HCT 40.7 10/29/2021    MCV 86.1 10/29/2021     10/29/2021     Lab Results   Component Value Date    CHOL 209 (H) 10/29/2021    TRIG 114 10/29/2021    HDL 46 10/29/2021    LDLCALC 140 (H) 10/29/2021    LDLDIRECT 126 (H) 05/05/2011     Lab Results   Component Value Date    ALT 22 10/29/2021    AST 15 10/29/2021     BMP:    Lab Results   Component Value Date     10/29/2021    K 4.1 10/29/2021     10/29/2021    CO2 24 10/29/2021    BUN 16 10/29/2021    CREATININE 0.7 10/29/2021     CMP:   Lab Results   Component Value Date     10/29/2021    K 4.1 10/29/2021     10/29/2021    CO2 24 10/29/2021    BUN 16 10/29/2021    PROT 7.3 10/29/2021    PROT 7.1 05/05/2011     TSH:    Lab Results   Component Value Date    TSH 0.82 10/29/2021    TSHHS 0.953 06/20/2012           Assessment & Plan:    -Abnormal EKG that is left bundle branch block, had a abnormal stress test as well which was questionable hence a CTA coronary was performed which showed no significant coronary artery disease and no other abnormalities as far as the cardiac etiology is concerned      -Palpitations: Patient had left bundle branch block with exercise and has been referred to EP  Patient has been on Toprol dose of which was increased to 48 recently given that the heart rate was going up into 140s    -Shortness of breath: Patient has been having shortness of breath however her echocardiogram echocardiogram has been normal with normal ejection fraction  Getting PFTs    Mortality from the morbid obesity is very high:  Patient's BMI is high was advised for exercise and diet for weight loss     Body mass index is 34.09 kg/m².         Kiko Fernandez MD    [facility] - [place]

## 2022-02-09 NOTE — PROCEDURES
CARDIAC FINDINGS:  Cardiac CT scanning was performed, CT angiography during contrast administration using a 64. Scanner using a slice thickness of 0.5 mm and Gantry rotation time of 600 ms. Scanning was performed using 110 K  using auto adjustment of mA  Adjustment achieved low dose, retrospective gating was used  Contrast Type: isovue 370   Scan length: Excellent    Left Main Coronary artery:  Left main originates normally from the left coronary sinus and gives rise to the left anterior descending artery and left circumflex artery. It is free of any significant atherosclerotic disease. Left anterior descending artery:  Left anterior descending artery is a normal caliber vessel that wraps around the apex and gives off  diagonal branches. No calcification noted in the LAD or the diagonal branches but ostial LAD seems to have an eccentric soft plaque with remodeling of about 20 to 30% in the ostial segment distally there is no other disease seen    Left circumflex artery:  Left circumflex artery is a moderate size vessel that gives off a marginal branch. The left circumflex artery is free of any significant atherosclerotic disease. No significant calcification noted    Right coronary artery:  Right coronary artery originates from the right coronary sinus. It seems to be the dominant vessel and gives rise to the posterior descending artery and posterior ventricular artery. The RCA is moderate size vessel. With no calcification noted in the RCA. Posterior descending artery and posterior lateral branches are free of any significant disease. Pericardium:  Pericardium is normal in appearance without any thickening, calcification or pericardial fluid. Cardiac structures: The estimated ejection fraction is: 67% The ascending aorta is measured at 34.8 mm. Interpretation summary:  There is no significant coronary artery disease identified.    Ostial LAD has an eccentric 20 to 30% possible soft plaque but no significant calcification. Distal circumflex in the AV groove is not clearly seen due to step artifact but proximal circumflex and OM are widely patent  RCA is a dominant vessel which is widely patent with patent PDA and PLV  Myocardium does not show any evidence of infarct or thickening. NON-CARDIAC FINDINGS - []      Please note that only cardiac images and windows were evaluated by cardiology. Images reviewed for interpretation for cardiac structures and coronary artery disease only.   Any non cardiac assessment  require separate radiology report

## 2022-02-16 ASSESSMENT — ENCOUNTER SYMPTOMS
ABDOMINAL PAIN: 0
CHEST TIGHTNESS: 0
EYE PAIN: 0
WHEEZING: 0
VOMITING: 0
NAUSEA: 0
COLOR CHANGE: 0
PHOTOPHOBIA: 0
CONSTIPATION: 0
BLOOD IN STOOL: 0
DIARRHEA: 0
SHORTNESS OF BREATH: 1
BACK PAIN: 0
COUGH: 0

## 2022-02-18 DIAGNOSIS — Z01.818 PRE-OP TESTING: Primary | ICD-10-CM

## 2022-02-24 ENCOUNTER — HOSPITAL ENCOUNTER (OUTPATIENT)
Dept: LAB | Age: 42
Discharge: HOME | End: 2022-02-24
Payer: COMMERCIAL

## 2022-02-24 LAB
SARS-COV-2: NOT DETECTED
SOURCE: NORMAL

## 2022-02-24 PROCEDURE — U0003 INFECTIOUS AGENT DETECTION BY NUCLEIC ACID (DNA OR RNA); SEVERE ACUTE RESPIRATORY SYNDROME CORONAVIRUS 2 (SARS-COV-2) (CORONAVIRUS DISEASE [COVID-19]), AMPLIFIED PROBE TECHNIQUE, MAKING USE OF HIGH THROUGHPUT TECHNOLOGIES AS DESCRIBED BY CMS-2020-01-R: HCPCS

## 2022-02-24 PROCEDURE — U0005 INFEC AGEN DETEC AMPLI PROBE: HCPCS

## 2022-03-01 ENCOUNTER — HOSPITAL ENCOUNTER (OUTPATIENT)
Dept: PULMONOLOGY | Age: 42
Discharge: HOME | End: 2022-03-01
Payer: COMMERCIAL

## 2022-03-01 DIAGNOSIS — R06.09 DYSPNEA ON EXERTION: ICD-10-CM

## 2022-03-01 LAB
DLCO %PRED: 100 %
DLCO PRED: NORMAL
DLCO/VA %PRED: NORMAL
DLCO/VA PRED: NORMAL
DLCO/VA: NORMAL
DLCO: NORMAL
EXPIRATORY TIME-POST: NORMAL
EXPIRATORY TIME: NORMAL
FEF 25-75% %CHNG: NORMAL
FEF 25-75% %PRED-POST: NORMAL
FEF 25-75% %PRED-PRE: NORMAL
FEF 25-75% PRED: NORMAL
FEF 25-75%-POST: NORMAL
FEF 25-75%-PRE: NORMAL
FEV1 %PRED-POST: 118 %
FEV1 %PRED-PRE: 118 %
FEV1 PRED: NORMAL
FEV1-POST: NORMAL
FEV1-PRE: NORMAL
FEV1/FVC %PRED-POST: NORMAL
FEV1/FVC %PRED-PRE: NORMAL
FEV1/FVC PRED: NORMAL
FEV1/FVC-POST: 91 %
FEV1/FVC-PRE: 89 %
FVC %PRED-POST: NORMAL
FVC %PRED-PRE: NORMAL
FVC PRED: NORMAL
FVC-POST: NORMAL
FVC-PRE: NORMAL
GAW %PRED: NORMAL
GAW PRED: NORMAL
GAW: NORMAL
IC %PRED: NORMAL
IC PRED: NORMAL
IC: NORMAL
MEP: NORMAL
MIP: NORMAL
MVV %PRED-PRE: NORMAL
MVV PRED: NORMAL
MVV-PRE: NORMAL
PEF %PRED-POST: NORMAL
PEF %PRED-PRE: NORMAL
PEF PRED: NORMAL
PEF%CHNG: NORMAL
PEF-POST: NORMAL
PEF-PRE: NORMAL
RAW %PRED: NORMAL
RAW PRED: NORMAL
RAW: NORMAL
RV %PRED: NORMAL
RV PRED: NORMAL
RV: NORMAL
SVC %PRED: NORMAL
SVC PRED: NORMAL
SVC: NORMAL
TLC %PRED: 120 %
TLC PRED: NORMAL
TLC: NORMAL
VA %PRED: NORMAL
VA PRED: NORMAL
VA: NORMAL
VTG %PRED: NORMAL
VTG PRED: NORMAL
VTG: NORMAL

## 2022-03-01 PROCEDURE — 94726 PLETHYSMOGRAPHY LUNG VOLUMES: CPT

## 2022-03-01 PROCEDURE — 94729 DIFFUSING CAPACITY: CPT

## 2022-03-01 PROCEDURE — 94060 EVALUATION OF WHEEZING: CPT

## 2022-03-01 ASSESSMENT — PULMONARY FUNCTION TESTS
FEV1_PERCENT_PREDICTED_POST: 118
FEV1/FVC_POST: 91
FEV1_PERCENT_PREDICTED_PRE: 118
FEV1/FVC_PRE: 89

## 2022-03-23 ENCOUNTER — OFFICE VISIT (OUTPATIENT)
Dept: FAMILY MEDICINE CLINIC | Age: 42
End: 2022-03-23
Payer: COMMERCIAL

## 2022-03-23 VITALS
TEMPERATURE: 97.2 F | WEIGHT: 197 LBS | HEART RATE: 82 BPM | OXYGEN SATURATION: 97 % | HEIGHT: 64 IN | BODY MASS INDEX: 33.63 KG/M2

## 2022-03-23 DIAGNOSIS — J02.9 SORE THROAT: ICD-10-CM

## 2022-03-23 DIAGNOSIS — J40 BRONCHITIS: Primary | ICD-10-CM

## 2022-03-23 LAB — S PYO AG THROAT QL: NORMAL

## 2022-03-23 PROCEDURE — 99213 OFFICE O/P EST LOW 20 MIN: CPT | Performed by: NURSE PRACTITIONER

## 2022-03-23 PROCEDURE — 87880 STREP A ASSAY W/OPTIC: CPT | Performed by: NURSE PRACTITIONER

## 2022-03-23 RX ORDER — AZITHROMYCIN 250 MG/1
TABLET, FILM COATED ORAL
Qty: 1 PACKET | Refills: 0 | Status: SHIPPED | OUTPATIENT
Start: 2022-03-23 | End: 2022-09-14

## 2022-03-23 NOTE — PROGRESS NOTES
3/23/2022    HPI:  Chief complaint and history of present illness as per medical assistant/nurse documented today in the Flu/COVID-19 clinic. Patient is here with complaints of cough, chest/nasal congestion, chest tightness with cough, sore throat, fatigue, and laryngitis x 2 days. Patient states she has had her covid vaccine. Patient states she was positive for covid in February 2021. Patient declines to be tested for covid. MEDICATIONS:  Prior to Visit Medications    Medication Sig Taking? Authorizing Provider   metoprolol succinate (TOPROL XL) 50 MG extended release tablet Take 1 tablet by mouth daily  JORGE Santoyo CNP   oxybutynin (DITROPAN-XL) 10 MG extended release tablet Take 10 mg by mouth daily  Historical Provider, MD   Fspmaxrnbmfc-Gibepqx-L-Probiot (AZO URINARY TRACT SUPPORT PO) Take 500 mg by mouth daily  Historical Provider, MD   Multiple Vitamin (MULTI-VITAMIN DAILY PO) Take by mouth  Historical Provider, MD   Cyanocobalamin (B-12 PO) Take 3,000 mcg by mouth daily  Historical Provider, MD   omeprazole (PRILOSEC) 20 MG delayed release capsule Take 1 capsule by mouth once daily  Marian Valle MD   LORazepam (ATIVAN) 0.5 MG tablet Take 0.5 mg by mouth daily. Historical Provider, MD   cetirizine (ZYRTEC) 10 MG tablet Take 1 tablet by mouth daily  Marian Valle MD   butalbital-APAP-caffeine -40 MG CAPS per capsule Take 1 capsule by mouth every 6 hours as needed for Headaches  Gonzalo Levin MD   SUMAtriptan (IMITREX) 100 MG tablet Take 1 tablet by mouth as needed for Migraine  Marian Valle MD   Biotin w/ Vitamins C & E (HAIR SKIN & NAILS GUMMIES PO) Take 2 each by mouth daily  Historical Provider, MD   Black Cohosh 40 MG CAPS Take 1 tablet by mouth daily  Historical Provider, MD       Allergies   Allergen Reactions    Vicodin [Hydrocodone-Acetaminophen]      Makes patient nauseated.     Penicillins Other (See Comments)     Family allergy, never given  Mom has never had but strong family reaction.  Sulfa Antibiotics Other (See Comments)     Family allergy, never given    Hydrocodone-Acetaminophen Nausea And Vomiting   ,   Past Medical History:   Diagnosis Date    History of CT scan (CTA) 2022    There is no significant coronary artery disease identified    Hypertension     Migraines    ,   Past Surgical History:   Procedure Laterality Date    CHOLECYSTECTOMY      HERNIA REPAIR      HYSTERECTOMY      NASAL SINUS SURGERY     ,   Social History     Tobacco Use    Smoking status: Former Smoker     Packs/day: 0.50     Years: 16.00     Pack years: 8.00     Types: Cigarettes     Start date:      Quit date:      Years since quittin.2    Smokeless tobacco: Never Used   Vaping Use    Vaping Use: Never used   Substance Use Topics    Alcohol use: Yes     Alcohol/week: 0.0 standard drinks     Comment: On occasion. Caffine: 24 oz coffee daily     Drug use: No   , History reviewed. No pertinent family history. ,   Immunization History   Administered Date(s) Administered    COVID-19, Juan De La Rosa, Primary or Immunocompromised, PF, 100mcg/0.5mL 2021, 2021    Influenza Virus Vaccine 10/29/2015, 10/22/2018, 10/08/2019, 10/24/2019    Influenza, Quadv, IM, PF (6 mo and older Fluzone, Flulaval, Fluarix, and 3 yrs and older Afluria) 2017    Tdap (Boostrix, Adacel) 2012, 2017   ,   Health Maintenance   Topic Date Due    HIV screen  Never done    COVID-19 Vaccine (3 - Booster for Moderna series) 2021    Depression Screen  10/29/2022    Lipid screen  10/29/2026    DTaP/Tdap/Td vaccine (4 - Td or Tdap) 2027    Flu vaccine  Completed    Hepatitis C screen  Completed    Hepatitis A vaccine  Aged Out    Hepatitis B vaccine  Aged Out    Hib vaccine  Aged Out    Meningococcal (ACWY) vaccine  Aged Out    Pneumococcal 0-64 years Vaccine  Aged Out       PHYSICAL EXAM:  Physical Exam  Constitutional:       Appearance: Normal appearance. HENT:      Head: Normocephalic. Right Ear: Tympanic membrane, ear canal and external ear normal.      Left Ear: Tympanic membrane, ear canal and external ear normal.      Nose: Congestion (slight) present. Mouth/Throat:      Lips: Pink. Mouth: Mucous membranes are moist.      Pharynx: Posterior oropharyngeal erythema present. Cardiovascular:      Rate and Rhythm: Normal rate and regular rhythm. Heart sounds: Normal heart sounds. Pulmonary:      Effort: Pulmonary effort is normal.      Breath sounds: Normal breath sounds. Musculoskeletal:      Cervical back: Neck supple. Skin:     General: Skin is warm and dry. Neurological:      Mental Status: She is alert and oriented to person, place, and time. Psychiatric:         Mood and Affect: Mood normal.         Behavior: Behavior normal.         ASSESSMENT/PLAN:    1. Bronchitis  Encourage clear fluids without caffeine  - Smaller, more frequent meals to ensure hydration.   - Saline nasal spray, cool mist humidifier, prop head at night for congestion.   - May use spoonfuls of honey to coat throat.    - Tylenol as needed for fever, pain.    - Counseled on signs of increased work of breathing.   - RTO if sxs increase or no improvement  Discussed with patient to rest voice today since she is losing it.   - azithromycin (ZITHROMAX) 250 MG tablet; Take 2 tabs (500 mg) on Day 1, and take 1 tab (250 mg) on days 2 through 5. Dispense: 1 packet; Refill: 0    2. Sore throat  Strep test is negative. - Encourage clear fluids without caffeine to ensure hydration.  - Warm salt water gurgles to soothe throat. - May use spoonfuls of honey to coat throat. - Rest voice. - Tylenol or ibuprofen as needed for fever, pain. - Counseled on signs of increased work of breathing.   - RTO if sxs increase or no improvement.   - POCT rapid strep A      FOLLOW-UP:  Return if symptoms worsen or fail to improve.     In addition to other information, the printed after visit summary provided to the patient includes:  [] COVID-19 Self care instructions  [] COVID-19 General patient information

## 2022-04-20 ENCOUNTER — OFFICE VISIT (OUTPATIENT)
Dept: FAMILY MEDICINE CLINIC | Age: 42
End: 2022-04-20
Payer: COMMERCIAL

## 2022-04-20 ENCOUNTER — HOSPITAL ENCOUNTER (OUTPATIENT)
Age: 42
Setting detail: SPECIMEN
Discharge: HOME | End: 2022-04-20
Payer: COMMERCIAL

## 2022-04-20 VITALS
WEIGHT: 196 LBS | SYSTOLIC BLOOD PRESSURE: 122 MMHG | TEMPERATURE: 97.7 F | OXYGEN SATURATION: 97 % | DIASTOLIC BLOOD PRESSURE: 64 MMHG | HEART RATE: 84 BPM | RESPIRATION RATE: 16 BRPM | BODY MASS INDEX: 31.5 KG/M2 | HEIGHT: 66 IN

## 2022-04-20 DIAGNOSIS — R39.9 UTI SYMPTOMS: Primary | ICD-10-CM

## 2022-04-20 LAB
BILIRUBIN, POC: NEGATIVE
BLOOD URINE, POC: NEGATIVE
CLARITY, POC: NORMAL
COLOR, POC: YELLOW
GLUCOSE URINE, POC: NEGATIVE
KETONES, POC: NEGATIVE
LEUKOCYTE EST, POC: NORMAL
NITRITE, POC: NEGATIVE
PH, POC: 6
PROTEIN, POC: NEGATIVE
SPECIFIC GRAVITY, POC: 1.03
UROBILINOGEN, POC: 0.2

## 2022-04-20 PROCEDURE — 81002 URINALYSIS NONAUTO W/O SCOPE: CPT | Performed by: NURSE PRACTITIONER

## 2022-04-20 PROCEDURE — 99213 OFFICE O/P EST LOW 20 MIN: CPT | Performed by: NURSE PRACTITIONER

## 2022-04-20 PROCEDURE — 87086 URINE CULTURE/COLONY COUNT: CPT

## 2022-04-20 RX ORDER — PHENAZOPYRIDINE HYDROCHLORIDE 100 MG/1
100 TABLET, FILM COATED ORAL 3 TIMES DAILY PRN
Qty: 9 TABLET | Refills: 0 | Status: SHIPPED | OUTPATIENT
Start: 2022-04-20 | End: 2023-04-20

## 2022-04-20 RX ORDER — NITROFURANTOIN 25; 75 MG/1; MG/1
100 CAPSULE ORAL 2 TIMES DAILY
Qty: 10 CAPSULE | Refills: 0 | Status: SHIPPED | OUTPATIENT
Start: 2022-04-20 | End: 2022-04-25

## 2022-04-20 ASSESSMENT — ENCOUNTER SYMPTOMS
VOMITING: 0
NAUSEA: 0

## 2022-04-20 NOTE — PROGRESS NOTES
Sarah Cancer   38 y.o.  female  [id number]      Chief Complaint   Patient presents with    Urinary Tract Infection        Subjective:  38 y. o.female is here for a follow up. She has the following chronic/acute medical problems:  Patient Active Problem List   Diagnosis    Migraine equivalent syndrome    Hypertension    Obesity (BMI 30.0-34. 9)    Hyperglycemia    Left bundle branch block       Urinary Tract Infection   This is a new problem. The current episode started in the past 7 days (2 days ago). The problem occurs every urination. The quality of the pain is described as burning. The pain is mild. There has been no fever. She is sexually active. There is no history of pyelonephritis. Associated symptoms include frequency and urgency. Pertinent negatives include no chills, discharge, flank pain, hematuria, hesitancy, nausea, possible pregnancy, sweats or vomiting. She has tried nothing for the symptoms. Review of Systems   Constitutional: Negative for appetite change, chills, fatigue and fever. HENT: Negative for congestion, ear pain, postnasal drip, rhinorrhea, sinus pressure, sinus pain, sneezing and sore throat. Respiratory: Negative for cough, chest tightness, shortness of breath and wheezing. Cardiovascular: Negative for chest pain and palpitations. Gastrointestinal: Negative for diarrhea, nausea and vomiting. Genitourinary: Positive for dysuria, frequency and urgency. Negative for flank pain, hematuria and hesitancy. Skin: Negative for rash. Neurological: Negative for dizziness, light-headedness and headaches.        Current Outpatient Medications   Medication Sig Dispense Refill    metoprolol succinate (TOPROL XL) 50 MG extended release tablet Take 1 tablet by mouth daily 30 tablet 5    oxybutynin (DITROPAN-XL) 10 MG extended release tablet Take 10 mg by mouth daily      Zwzyxekoficz-Hzzzugv-Z-Probiot (AZO URINARY TRACT SUPPORT PO) Take 500 mg by mouth daily      Multiple Vitamin (MULTI-VITAMIN DAILY PO) Take by mouth      Cyanocobalamin (B-12 PO) Take 3,000 mcg by mouth daily      omeprazole (PRILOSEC) 20 MG delayed release capsule Take 1 capsule by mouth once daily 90 capsule 3    LORazepam (ATIVAN) 0.5 MG tablet Take 0.5 mg by mouth daily.  cetirizine (ZYRTEC) 10 MG tablet Take 1 tablet by mouth daily 90 tablet 1    butalbital-APAP-caffeine -40 MG CAPS per capsule Take 1 capsule by mouth every 6 hours as needed for Headaches 30 capsule 0    SUMAtriptan (IMITREX) 100 MG tablet Take 1 tablet by mouth as needed for Migraine 9 tablet 5    Biotin w/ Vitamins C & E (HAIR SKIN & NAILS GUMMIES PO) Take 2 each by mouth daily      Black Cohosh 40 MG CAPS Take 1 tablet by mouth daily      azithromycin (ZITHROMAX) 250 MG tablet Take 2 tabs (500 mg) on Day 1, and take 1 tab (250 mg) on days 2 through 5. (Patient not taking: Reported on 4/20/2022) 1 packet 0     No current facility-administered medications for this visit. Past medical, family,surgical history reviewed today. Objective:  /64 (Site: Right Upper Arm, Position: Sitting, Cuff Size: Medium Adult)   Pulse 84   Temp 97.7 °F (36.5 °C) (Temporal)   Resp 16   Ht 5' 6\" (1.676 m)   Wt 196 lb (88.9 kg)   SpO2 97%   BMI 31.64 kg/m²   BP Readings from Last 3 Encounters:   04/20/22 122/64   02/09/22 126/82   02/02/22 132/88     Wt Readings from Last 3 Encounters:   04/20/22 196 lb (88.9 kg)   03/23/22 197 lb (89.4 kg)   02/09/22 198 lb 9.6 oz (90.1 kg)         Physical Exam  Constitutional:       Appearance: Normal appearance. HENT:      Head: Normocephalic. Cardiovascular:      Rate and Rhythm: Normal rate and regular rhythm. Heart sounds: Normal heart sounds. Pulmonary:      Effort: Pulmonary effort is normal.      Breath sounds: Normal breath sounds. Musculoskeletal:      Cervical back: Neck supple. Skin:     General: Skin is warm and dry.    Neurological:      Mental Status: She is alert and oriented to person, place, and time. Psychiatric:         Mood and Affect: Mood normal.         Behavior: Behavior normal.         Lab Results   Component Value Date    WBC 7.1 10/29/2021    HGB 13.7 10/29/2021    HCT 40.7 10/29/2021    MCV 86.1 10/29/2021     10/29/2021     Lab Results   Component Value Date     10/29/2021    K 4.1 10/29/2021     10/29/2021    CO2 24 10/29/2021    BUN 16 10/29/2021    CREATININE 0.7 10/29/2021    GLUCOSE 90 10/29/2021    CALCIUM 10.0 10/29/2021    PROT 7.3 10/29/2021    LABALBU 4.8 10/29/2021    BILITOT 0.3 10/29/2021    ALKPHOS 109 10/29/2021    AST 15 10/29/2021    ALT 22 10/29/2021    LABGLOM >60 10/29/2021    GFRAA >60 10/29/2021    AGRATIO 1.9 10/29/2021    GLOB 2.9 04/25/2019     Lab Results   Component Value Date    CHOL 209 (H) 10/29/2021    CHOL 166 04/15/2019    CHOL 199 05/07/2015     Lab Results   Component Value Date    TRIG 114 10/29/2021    TRIG 97 04/15/2019    TRIG 193 (H) 05/07/2015     Lab Results   Component Value Date    HDL 46 10/29/2021    HDL 47 04/15/2019    HDL 39 (L) 03/19/2019     Lab Results   Component Value Date    LDLCALC 140 (H) 10/29/2021    LDLCALC 100 (H) 04/15/2019    LDLCALC 109 (H) 03/19/2019     Lab Results   Component Value Date    LABA1C 5.2 03/12/2019     Lab Results   Component Value Date    TSH 0.82 10/29/2021    TSHHS 0.953 06/20/2012     Results for orders placed or performed in visit on 04/20/22   POCT Urinalysis no Micro   Result Value Ref Range    Color, UA YELLOW     Clarity, UA HAZY     Glucose, UA POC NEGATIVE     Bilirubin, UA NEGATIVE     Ketones, UA NEGATIVE     Spec Grav, UA 1.030     Blood, UA POC NEGATIVE     pH, UA 6.0     Protein, UA POC NEGATIVE     Urobilinogen, UA 0.2     Leukocytes, UA LARGE     Nitrite, UA NEGATIVE          ASSESSMENT/PLAN:    1. UTI symptoms  POC Urinalysis showed large leukocytes. Will send out urine culture and call patient with results.  Will start Macrobid based on symptoms. - nitrofurantoin, macrocrystal-monohydrate, (MACROBID) 100 MG capsule; Take 1 capsule by mouth 2 times daily for 5 days  Dispense: 10 capsule; Refill: 0  - phenazopyridine (PYRIDIUM) 100 MG tablet; Take 1 tablet by mouth 3 times daily as needed for Pain  Dispense: 9 tablet; Refill: 0  - POCT Urinalysis no Micro  - Urine Culture    There are no diagnoses linked to this encounter. No orders of the defined types were placed in this encounter. There are no discontinued medications. No orders of the defined types were placed in this encounter. Care discussed with patient. Questions answered. Patient verbalizes understanding and agrees with plan. After visit summary provided. Advised to call for any problems, questions, or concerns. Return if symptoms worsen or fail to improve.                                              Signed:  JORGE Casiano CNP  04/20/22  5:28 PM

## 2022-04-21 LAB
CULTURE: NORMAL
Lab: NORMAL
SPECIMEN: NORMAL

## 2022-05-10 ASSESSMENT — ENCOUNTER SYMPTOMS
SHORTNESS OF BREATH: 0
COUGH: 0
SINUS PAIN: 0
CHEST TIGHTNESS: 0
DIARRHEA: 0
WHEEZING: 0
RHINORRHEA: 0
SINUS PRESSURE: 0
SORE THROAT: 0

## 2022-05-22 ENCOUNTER — PATIENT MESSAGE (OUTPATIENT)
Dept: FAMILY MEDICINE CLINIC | Age: 42
End: 2022-05-22

## 2022-05-23 NOTE — TELEPHONE ENCOUNTER
From: Rosa Chavez  To: Dr. Silvia Farnsworth: 5/22/2022 8:08 AM EDT  Subject: Medication refill    Andrew Dos Santos is no longer with the urology group so I was would like to see if you will take over prescribing the oxybutynin? I take oxybutynin ER 10mg po once daily. Since starting this regimen a year ago, I have only had 1 UTI and I was getting several a year. I use [facility] for my pharmacy.

## 2022-05-24 RX ORDER — OXYBUTYNIN CHLORIDE 10 MG/1
10 TABLET, EXTENDED RELEASE ORAL DAILY
Qty: 30 TABLET | Refills: 5 | Status: SHIPPED | OUTPATIENT
Start: 2022-05-24

## 2022-06-15 ENCOUNTER — NURSE ONLY (OUTPATIENT)
Dept: INTERNAL MEDICINE CLINIC | Age: 42
End: 2022-06-15
Payer: COMMERCIAL

## 2022-06-15 DIAGNOSIS — T80.90XA COMPLICATION OF INJECTION, INITIAL ENCOUNTER: Primary | ICD-10-CM

## 2022-06-15 PROCEDURE — 90471 IMMUNIZATION ADMIN: CPT | Performed by: PHYSICIAN ASSISTANT

## 2022-06-15 PROCEDURE — 90715 TDAP VACCINE 7 YRS/> IM: CPT | Performed by: PHYSICIAN ASSISTANT

## 2022-06-23 RX ORDER — METOPROLOL SUCCINATE 50 MG/1
TABLET, EXTENDED RELEASE ORAL
Qty: 30 TABLET | Refills: 1 | Status: SHIPPED | OUTPATIENT
Start: 2022-06-23 | End: 2022-09-14 | Stop reason: SDUPTHER

## 2022-09-12 LAB
CHOLESTEROL: 194 MG/DL
GLUCOSE BLD-MCNC: 96 MG/DL (ref 70–99)
HDLC SERPL-MCNC: 46 MG/DL
LDL CHOLESTEROL CALCULATED: 118 MG/DL
PATIENT FASTING?: YES
TRIGL SERPL-MCNC: 152 MG/DL

## 2022-09-13 ENCOUNTER — HOSPITAL ENCOUNTER (OUTPATIENT)
Dept: MAMMOGRAPHY | Age: 42
Discharge: HOME | End: 2022-09-13
Payer: COMMERCIAL

## 2022-09-13 DIAGNOSIS — Z12.31 SCREENING MAMMOGRAM, ENCOUNTER FOR: ICD-10-CM

## 2022-09-13 PROCEDURE — 77063 BREAST TOMOSYNTHESIS BI: CPT

## 2022-09-14 ENCOUNTER — OFFICE VISIT (OUTPATIENT)
Dept: FAMILY MEDICINE CLINIC | Age: 42
End: 2022-09-14
Payer: COMMERCIAL

## 2022-09-14 VITALS
BODY MASS INDEX: 31.1 KG/M2 | RESPIRATION RATE: 16 BRPM | SYSTOLIC BLOOD PRESSURE: 122 MMHG | DIASTOLIC BLOOD PRESSURE: 68 MMHG | HEART RATE: 83 BPM | HEIGHT: 66 IN | OXYGEN SATURATION: 99 % | WEIGHT: 193.5 LBS

## 2022-09-14 DIAGNOSIS — I10 PRIMARY HYPERTENSION: Primary | ICD-10-CM

## 2022-09-14 DIAGNOSIS — Z86.39 HISTORY OF VITAMIN D DEFICIENCY: ICD-10-CM

## 2022-09-14 DIAGNOSIS — Z23 NEED FOR INFLUENZA VACCINATION: ICD-10-CM

## 2022-09-14 DIAGNOSIS — N95.1 MENOPAUSAL HOT FLUSHES: ICD-10-CM

## 2022-09-14 DIAGNOSIS — G43.109 MIGRAINE EQUIVALENT SYNDROME: ICD-10-CM

## 2022-09-14 DIAGNOSIS — Z87.898 HISTORY OF PREDIABETES: ICD-10-CM

## 2022-09-14 PROCEDURE — 90471 IMMUNIZATION ADMIN: CPT | Performed by: FAMILY MEDICINE

## 2022-09-14 PROCEDURE — 99214 OFFICE O/P EST MOD 30 MIN: CPT | Performed by: FAMILY MEDICINE

## 2022-09-14 PROCEDURE — 90674 CCIIV4 VAC NO PRSV 0.5 ML IM: CPT | Performed by: FAMILY MEDICINE

## 2022-09-14 RX ORDER — ESTRADIOL 0.5 MG/1
0.5 TABLET ORAL DAILY
Qty: 30 TABLET | Refills: 2 | Status: SHIPPED | OUTPATIENT
Start: 2022-09-14

## 2022-09-14 RX ORDER — METOPROLOL SUCCINATE 50 MG/1
TABLET, EXTENDED RELEASE ORAL
Qty: 90 TABLET | Refills: 3 | Status: SHIPPED | OUTPATIENT
Start: 2022-09-14

## 2022-09-14 SDOH — ECONOMIC STABILITY: FOOD INSECURITY: WITHIN THE PAST 12 MONTHS, YOU WORRIED THAT YOUR FOOD WOULD RUN OUT BEFORE YOU GOT MONEY TO BUY MORE.: PATIENT DECLINED

## 2022-09-14 SDOH — ECONOMIC STABILITY: FOOD INSECURITY: WITHIN THE PAST 12 MONTHS, THE FOOD YOU BOUGHT JUST DIDN'T LAST AND YOU DIDN'T HAVE MONEY TO GET MORE.: PATIENT DECLINED

## 2022-09-14 ASSESSMENT — PATIENT HEALTH QUESTIONNAIRE - PHQ9
SUM OF ALL RESPONSES TO PHQ9 QUESTIONS 1 & 2: 0
SUM OF ALL RESPONSES TO PHQ QUESTIONS 1-9: 0
DEPRESSION UNABLE TO ASSESS: FUNCTIONAL CAPACITY MOTIVATION LIMITS ACCURACY
1. LITTLE INTEREST OR PLEASURE IN DOING THINGS: 0
SUM OF ALL RESPONSES TO PHQ QUESTIONS 1-9: 0
2. FEELING DOWN, DEPRESSED OR HOPELESS: 0

## 2022-09-14 ASSESSMENT — SOCIAL DETERMINANTS OF HEALTH (SDOH): HOW HARD IS IT FOR YOU TO PAY FOR THE VERY BASICS LIKE FOOD, HOUSING, MEDICAL CARE, AND HEATING?: PATIENT DECLINED

## 2022-09-14 NOTE — PROGRESS NOTES
Flu  Chief Complaint   Patient presents with    Flu Vaccine     Patient is requesting her flu vaccine today    Menopause     Discuss symptoms    Hypertension     Here for recheck on hypertension, control is good; weight is down slightly to 193 pounds with BMI of 31    Other     History of prediabetic status, no longer    Stress Reaction     Note stress is increased since she is working full-time and finishing school and seeking a new job       [place] NARRATIVE:    Patient is established unless otherwise noted. Above chief complaint and [place] obtained by this physician provider. Review of Systems   Constitutional:  Positive for diaphoresis (Related to menopausal state). Negative for activity change, chills, fatigue and fever. HENT:  Negative for congestion. Respiratory:  Negative for cough, chest tightness, shortness of breath and wheezing. Cardiovascular:  Negative for chest pain and leg swelling. Gastrointestinal:  Negative for abdominal pain. Musculoskeletal:  Negative for back pain and myalgias. Skin:  Negative for rash. Psychiatric/Behavioral:  Negative for behavioral problems and dysphoric mood. Allergies   Allergen Reactions    Vicodin [Hydrocodone-Acetaminophen]      Makes patient nauseated. Penicillins Other (See Comments)     Family allergy, never given  Mom has never had but strong family reaction. Sulfa Antibiotics Other (See Comments)     Family allergy, never given    Hydrocodone-Acetaminophen Nausea And Vomiting     Allergy historyupdated. Outpatient Medications Marked as Taking for the 9/14/22 encounter (Office Visit) with Endy Carrillo MD   Medication Sig Dispense Refill    estradiol (ESTRACE) 0.5 MG tablet Take 1 tablet by mouth daily 30 tablet 2    metoprolol succinate (TOPROL XL) 50 MG extended release tablet Take 1 tablet by mouth once daily 90 tablet 3       Tobacco use history updated.    Social History     Tobacco Use   Smoking Status Former    Packs/day: 1.00 Years: 10.00    Pack years: 10.00    Types: Cigarettes    Start date: 2005    Quit date: 3/19/2009    Years since quittin.5   Smokeless Tobacco Never   Tobacco Comments    I also smoked from  to         Nursing note reviewed. Vitals:    22 1412   BP: 122/68   Pulse: 83   Resp: 16   SpO2: 99%   Weight: 193 lb 8 oz (87.8 kg)   Height: 5' 6\" (1.676 m)          BP Readings from Last 3 Encounters:   22 122/68   22 122/64   22 126/82     Wt Readings from Last 3 Encounters:   22 193 lb 8 oz (87.8 kg)   22 196 lb (88.9 kg)   22 197 lb (89.4 kg)     Body mass index is 31.23 kg/m². No results found for this visit on 22. Physical Exam  Vitals and nursing note reviewed. Constitutional:       General: She is not in acute distress. Appearance: She is well-developed. She is not diaphoretic. HENT:      Head: Normocephalic. Eyes:      General:         Right eye: No discharge. Left eye: No discharge. Conjunctiva/sclera: Conjunctivae normal.      Pupils: Pupils are equal, round, and reactive to light. Cardiovascular:      Rate and Rhythm: Normal rate and regular rhythm. Heart sounds: Normal heart sounds. No murmur heard. Pulmonary:      Effort: Pulmonary effort is normal. No respiratory distress. Breath sounds: Normal breath sounds. No wheezing or rales. Musculoskeletal:      Cervical back: Normal range of motion. Skin:     General: Skin is warm and dry. Neurological:      Mental Status: She is alert and oriented to person, place, and time. Psychiatric:         Behavior: Behavior normal.         _________________________________________________  Assessment:     1. Primary hypertension  -     metoprolol succinate (TOPROL XL) 50 MG extended release tablet; Take 1 tablet by mouth once daily, Disp-90 tablet, R-3Normal  2. Need for influenza vaccination  -     Influenza, FLUCELVAX, (age 10 mo+), IM, PF, 0.5 mL  3. Menopausal hot flushes  -     estradiol (ESTRACE) 0.5 MG tablet; Take 1 tablet by mouth daily, Disp-30 tablet, R-2Normal  4. History of vitamin D deficiency  5. History of prediabetes  6. Migraine equivalent syndrome    Interventions as above including a trial of Estrace. Problems listed above are stable and therapeutic plan is unchanged unless otherwise specified. See orders above and comments below for details of workup or medication orders. _________________________________________________  Plan:     Return in about 6 months (around 3/14/2023), or if symptoms worsen or fail to improve, for Recheck 6 months or even up to 1 year if she is doing well.       Electronically signed by Rosalio Thompson MD on 9/14/22 at 2:40 PM EDT

## 2022-09-30 ASSESSMENT — ENCOUNTER SYMPTOMS
BACK PAIN: 0
SHORTNESS OF BREATH: 0
ABDOMINAL PAIN: 0
COUGH: 0
WHEEZING: 0
CHEST TIGHTNESS: 0

## 2022-10-11 ENCOUNTER — HOSPITAL ENCOUNTER (OUTPATIENT)
Age: 42
Setting detail: SPECIMEN
Discharge: HOME | End: 2022-10-11
Payer: COMMERCIAL

## 2022-10-11 ENCOUNTER — OFFICE VISIT (OUTPATIENT)
Dept: FAMILY MEDICINE CLINIC | Age: 42
End: 2022-10-11
Payer: COMMERCIAL

## 2022-10-11 VITALS
HEIGHT: 64 IN | HEART RATE: 105 BPM | WEIGHT: 198.4 LBS | BODY MASS INDEX: 33.87 KG/M2 | TEMPERATURE: 99 F | RESPIRATION RATE: 12 BRPM | OXYGEN SATURATION: 95 %

## 2022-10-11 DIAGNOSIS — J06.9 VIRAL URI WITH COUGH: Primary | ICD-10-CM

## 2022-10-11 PROCEDURE — 99213 OFFICE O/P EST LOW 20 MIN: CPT | Performed by: PHYSICIAN ASSISTANT

## 2022-10-11 PROCEDURE — U0005 INFEC AGEN DETEC AMPLI PROBE: HCPCS

## 2022-10-11 PROCEDURE — U0003 INFECTIOUS AGENT DETECTION BY NUCLEIC ACID (DNA OR RNA); SEVERE ACUTE RESPIRATORY SYNDROME CORONAVIRUS 2 (SARS-COV-2) (CORONAVIRUS DISEASE [COVID-19]), AMPLIFIED PROBE TECHNIQUE, MAKING USE OF HIGH THROUGHPUT TECHNOLOGIES AS DESCRIBED BY CMS-2020-01-R: HCPCS

## 2022-10-11 NOTE — PROGRESS NOTES
10/11/22  Brian Anton  1980    FLU/COVID-19 CLINIC EVALUATION    HPI SYMPTOMS:    Employer: [employer]    [x] Fevers  [x] Chills  [x] Cough  [] Coughing up blood  [x] Chest Congestion  [x] Nasal Congestion  [] Feeling short of breath  [] Sometimes  [] Frequently  [] All the time  [] Chest pain  [x] Headaches  [x]Tolerable  [] Severe  [x] Sore throat  [x] Muscle aches  [x] Nausea  [] Vomiting  []Unable to keep fluids down  [] Diarrhea  []Severe    [x] OTHER SYMPTOMS:  fatigue and loss of taste    Symptom Duration:   [] 1  [] 2   [x] 3   [] 4    [] 5   [] 6   [] 7   [] 8   [] 9   [] 10   [] 11   [] 12   [] 13   [] 14   [] Longer than 14 days    Symptom course:   [x] Worsening     [] Stable     [] Improving    RISK FACTORS:    [] Pregnant or possibly pregnant  [] Age over 61  [] Diabetes  [] Heart disease  [] Asthma  [] COPD/Other chronic lung diseases  [] Active Cancer  [] On Chemotherapy  [] Taking oral steroids  [] History Lymphoma/Leukemia  [] Close contact with a lab confirmed COVID-19 patient within 14 days of symptom onset  [] History of travel from affected geographical areas within 14 days of symptom onset       VITALS:  There were no vitals filed for this visit. TESTS:    POCT FLU:  [] Positive     []Negative    ASSESSMENT:    [] Flu  [] Possible COVID-19  [] Strep    PLAN:    [] Discharge home with written instructions for:  [] Flu management  [] Possible COVID-19 infection with self-quarantine and management of symptoms  [] Follow-up with primary care physician or emergency department if worsens  [] Evaluation per physician/NP/PA in clinic  [] Sent to ER       An  electronic signature was used to authenticate this note.      --Nathan Pierson on 10/11/2022 at 3:25 PM

## 2022-10-11 NOTE — PATIENT INSTRUCTIONS
Your COVID 19 test can take 1-5 days for the results to come back. We ask that you make a Mychart page and view your test results this way. You will need to Self quarantine until you know your results. If positive, please work on contact tracing. Increase fluids and rest  Saline nasal spray as needed for nasal congestion  Warm salt gargles as needed for throat discomfort  Monitor temperature twice a day  Tylenol as needed for fevers and/or discomfort. Big deep breaths periodically throughout the day  Okay to continue over-the-counter cough/cold medications. Be careful with decongestants as they may increase pulse and blood pressure. If symptoms worsen -Go to the ER. Follow up with your primary care provider      To Whom it May Concern:    Sofie Rodriguez was tested for COVID-19 10/11/2022. He/she must stay home until test results are back. If test is negative, ok to return to work/school. If test is positive, isolate for a total of 5 days, starting from day 1 of symptom onset. After 5 days, if fever-free for 24 hours and there has been a gradual improvement in symptoms, may come out of isolation, but must consistently wear a mask when around other people for 5 additional days. (5 days isolation, 5 days mask-wearing). We do not recommend retesting as patients may continue to test positive for months even though no longer contagious. It is suggested you call [address] [facility] or [facility] with any questions regarding isolation timeframe/return to work/school details.         Rimma Soria, NARAYAN

## 2022-10-11 NOTE — PROGRESS NOTES
10/11/2022    [facility]    Chief Complaint   Patient presents with    Cough       HPI  History was obtained from patient. Bowen Real is a 38 y.o. female who presents today with complaints of 3-day history of intermittent headaches. Yesterday, she started developing tactile fever, chills, cough, nasal congestion, sore throat, myalgias, nausea, loss of taste and fatigue. She has tried eating a few things today and has not been able to taste them. She denies chest pain, chest tightness or heaviness, shortness of breath or wheezing, vomiting, diarrhea. She lives at home with her  who has been sick with similar symptoms but is starting to feel better. He was not COVID tested. She is continuing some type of Tylenol Cold and flu as needed. She reports good p.o. intake and urinary output. She is vaccinated against COVID-19 with 2 doses of Moderna. She is employed by [employer].       PAST MEDICAL HISTORY  Past Medical History:   Diagnosis Date    History of CT scan (CTA) 2022    There is no significant coronary artery disease identified    Hypertension     Migraines        FAMILY HISTORY  Family History   Problem Relation Age of Onset    Breast Cancer Paternal Grandmother     Diabetes Father     Cancer Maternal Grandmother         Non-Hodgkins lymphoma    Heart Attack Paternal Grandfather     Heart Disease Paternal Grandfather        SOCIAL HISTORY  Social History     Socioeconomic History    Marital status:      Spouse name: None    Number of children: None    Years of education: None    Highest education level: None   Tobacco Use    Smoking status: Former     Packs/day: 1.00     Years: 10.00     Pack years: 10.00     Types: Cigarettes     Start date: 2005     Quit date: 3/19/2009     Years since quittin.5    Smokeless tobacco: Never    Tobacco comments:     I also smoked from  to    Vaping Use    Vaping Use: Never used   Substance and Sexual Activity Alcohol use: Yes     Comment: I might have a few drinks a month    Drug use: No    Sexual activity: Yes     Partners: Male     Social Determinants of Health     Financial Resource Strain: Unknown    Difficulty of Paying Living Expenses: Patient refused   Food Insecurity: Unknown    Worried About Running Out of Food in the Last Year: Patient refused    Ran Out of Food in the Last Year: Patient refused        SURGICAL HISTORY  Past Surgical History:   Procedure Laterality Date    CHOLECYSTECTOMY  20014    HERNIA REPAIR      HYSTERECTOMY (CERVIX STATUS UNKNOWN)      HYSTERECTOMY, TOTAL ABDOMINAL (CERVIX REMOVED)      NASAL SINUS SURGERY  1991    OVARY REMOVAL         CURRENT MEDICATIONS  Current Outpatient Medications   Medication Sig Dispense Refill    estradiol (ESTRACE) 0.5 MG tablet Take 1 tablet by mouth daily 30 tablet 2    metoprolol succinate (TOPROL XL) 50 MG extended release tablet Take 1 tablet by mouth once daily 90 tablet 3    oxybutynin (DITROPAN XL) 10 mg extended release tablet Take 1 tablet by mouth daily 30 tablet 5    phenazopyridine (PYRIDIUM) 100 MG tablet Take 1 tablet by mouth 3 times daily as needed for Pain 9 tablet 0    Udyuuyoqkznl-Iaivpac-G-Probiot (AZO URINARY TRACT SUPPORT PO) Take 500 mg by mouth daily      Multiple Vitamin (MULTI-VITAMIN DAILY PO) Take by mouth      Cyanocobalamin (B-12 PO) Take 3,000 mcg by mouth daily      omeprazole (PRILOSEC) 20 MG delayed release capsule Take 1 capsule by mouth once daily 90 capsule 3    LORazepam (ATIVAN) 0.5 MG tablet Take 0.5 mg by mouth daily.       cetirizine (ZYRTEC) 10 MG tablet Take 1 tablet by mouth daily 90 tablet 1    butalbital-APAP-caffeine -40 MG CAPS per capsule Take 1 capsule by mouth every 6 hours as needed for Headaches 30 capsule 0    SUMAtriptan (IMITREX) 100 MG tablet Take 1 tablet by mouth as needed for Migraine 9 tablet 5    Biotin w/ Vitamins C & E (HAIR SKIN & NAILS GUMMIES PO) Take 2 each by mouth daily      Black Cohosh 40 MG CAPS Take 1 tablet by mouth daily       No current facility-administered medications for this visit. ALLERGIES  Allergies   Allergen Reactions    Vicodin [Hydrocodone-Acetaminophen]      Makes patient nauseated. Penicillins Other (See Comments)     Family allergy, never given  Mom has never had but strong family reaction. Sulfa Antibiotics Other (See Comments)     Family allergy, never given    Hydrocodone-Acetaminophen Nausea And Vomiting       PHYSICAL EXAM    Pulse (!) 105   Temp 99 °F (37.2 °C)   Resp 12   Ht 5' 4\" (1.626 m)   Wt 198 lb 6.4 oz (90 kg)   SpO2 95%   BMI 34.06 kg/m²     Constitutional:  Well developed, well nourished. No acute distress  HENT:  Normocephalic, atraumatic, bilateral external ears normal, bilateral ear canals and TMs normal, oropharynx moist, cobblestoning of posterior pharynx, postnasal drip noted. No petechiae, exudate, oral aphthous ulcers. Geographical tongue noted. Uvula midline and benign and airway patent. Nasal mucosa congested  Eyes:  conjunctiva normal, no discharge, no scleral icterus  Cardiovascular:  Normal heart rate, normal rhythm, no murmurs, gallops or rubs  Thorax & Lungs:  Normal breath sounds, no respiratory distress, no wheezing, no rales, no rhonchi  Skin:  Warm, dry, no erythema, no rash  Neurologic:  Alert & oriented   Psychiatric:  Affect normal, mood normal    ASSESSMENT & PLAN    Claudia Wahl was seen today for cough. Diagnoses and all orders for this visit:    Viral URI with cough  -     COVID-19     COVID-19 test results pending  Increase fluids and rest  Saline nasal spray as needed for nasal congestion  Warm salt gargles as needed for throat discomfort  Monitor temperature twice a day  Tylenol as needed for fevers and/or discomfort. Big deep breaths periodically throughout the day  Okay to continue over-the-counter cough/cold medications. Be careful with decongestants as they may increase pulse and blood pressure.   If symptoms worsen -Go to the ER. Follow up with your primary care provider    There are no discontinued medications. No follow-ups on file. Patient verbalizes understanding with the above plan and is in agreement. Patient will call with  questions or concerns. I did don appropriate PPE (including N95 face mask, protective safety glasses, face shield, gloves, and gown) as recommended by the health facility/national standard best practice, during my interaction with the patient. Please note that this chart was generated using dragon dictation software. Although every effort was made to ensure the accuracy of this automated transcription, some errors in transcription may have occurred.     Electronically signed by Halima Glynn PA-C on 10/11/2022

## 2022-10-12 LAB
SARS-COV-2: DETECTED
SOURCE: ABNORMAL

## 2022-10-12 RX ORDER — NIRMATRELVIR AND RITONAVIR 300-100 MG
KIT ORAL
Qty: 30 TABLET | Refills: 0 | Status: SHIPPED | OUTPATIENT
Start: 2022-10-12 | End: 2022-10-17

## 2022-11-01 ENCOUNTER — TELEPHONE (OUTPATIENT)
Dept: BARIATRICS/WEIGHT MGMT | Age: 42
End: 2022-11-01

## 2022-11-22 ENCOUNTER — HOSPITAL ENCOUNTER (OUTPATIENT)
Age: 42
Discharge: HOME | End: 2022-11-22
Payer: COMMERCIAL

## 2022-11-22 ENCOUNTER — OFFICE VISIT MH/BH (OUTPATIENT)
Dept: BARIATRICS/WEIGHT MGMT | Age: 42
End: 2022-11-22
Payer: COMMERCIAL

## 2022-11-22 VITALS
WEIGHT: 194.4 LBS | HEIGHT: 64 IN | DIASTOLIC BLOOD PRESSURE: 80 MMHG | SYSTOLIC BLOOD PRESSURE: 120 MMHG | OXYGEN SATURATION: 99 % | BODY MASS INDEX: 33.19 KG/M2 | HEART RATE: 88 BPM

## 2022-11-22 DIAGNOSIS — E66.9 OBESITY (BMI 30.0-34.9): ICD-10-CM

## 2022-11-22 DIAGNOSIS — Z79.899 MEDICATION MANAGEMENT: ICD-10-CM

## 2022-11-22 DIAGNOSIS — Z79.899 MEDICATION MANAGEMENT: Primary | ICD-10-CM

## 2022-11-22 LAB
ALBUMIN SERPL-MCNC: 4.7 GM/DL (ref 3.4–5)
ALP BLD-CCNC: 97 IU/L (ref 40–128)
ALT SERPL-CCNC: 39 U/L (ref 10–40)
AMPHETAMINES: NEGATIVE
ANION GAP SERPL CALCULATED.3IONS-SCNC: 11 MMOL/L (ref 4–16)
AST SERPL-CCNC: 35 IU/L (ref 15–37)
BARBITURATE SCREEN URINE: NEGATIVE
BASOPHILS ABSOLUTE: 0 K/CU MM
BASOPHILS RELATIVE PERCENT: 0.8 % (ref 0–1)
BENZODIAZEPINE SCREEN, URINE: NEGATIVE
BILIRUB SERPL-MCNC: 0.4 MG/DL (ref 0–1)
BUN BLDV-MCNC: 13 MG/DL (ref 6–23)
CALCIUM SERPL-MCNC: 9.2 MG/DL (ref 8.3–10.6)
CANNABINOID SCREEN URINE: NEGATIVE
CHLORIDE BLD-SCNC: 100 MMOL/L (ref 99–110)
CO2: 27 MMOL/L (ref 21–32)
COCAINE METABOLITE: NEGATIVE
CREAT SERPL-MCNC: 0.7 MG/DL (ref 0.6–1.1)
DIFFERENTIAL TYPE: ABNORMAL
EOSINOPHILS ABSOLUTE: 0.1 K/CU MM
EOSINOPHILS RELATIVE PERCENT: 1 % (ref 0–3)
GFR SERPL CREATININE-BSD FRML MDRD: >60 ML/MIN/1.73M2
GLUCOSE BLD-MCNC: 97 MG/DL (ref 70–99)
HCT VFR BLD CALC: 37.4 % (ref 37–47)
HEMOGLOBIN: 12.4 GM/DL (ref 12.5–16)
IMMATURE NEUTROPHIL %: 0.2 % (ref 0–0.43)
LYMPHOCYTES ABSOLUTE: 1.7 K/CU MM
LYMPHOCYTES RELATIVE PERCENT: 33.7 % (ref 24–44)
MCH RBC QN AUTO: 28.8 PG (ref 27–31)
MCHC RBC AUTO-ENTMCNC: 33.2 % (ref 32–36)
MCV RBC AUTO: 87 FL (ref 78–100)
MONOCYTES ABSOLUTE: 0.8 K/CU MM
MONOCYTES RELATIVE PERCENT: 15 % (ref 0–4)
NUCLEATED RBC %: 0 %
OPIATES, URINE: NEGATIVE
OXYCODONE: NEGATIVE
PDW BLD-RTO: 13.2 % (ref 11.7–14.9)
PHENCYCLIDINE, URINE: NEGATIVE
PLATELET # BLD: 284 K/CU MM (ref 140–440)
PMV BLD AUTO: 9.5 FL (ref 7.5–11.1)
POTASSIUM SERPL-SCNC: 3.5 MMOL/L (ref 3.5–5.1)
RBC # BLD: 4.3 M/CU MM (ref 4.2–5.4)
SEGMENTED NEUTROPHILS ABSOLUTE COUNT: 2.5 K/CU MM
SEGMENTED NEUTROPHILS RELATIVE PERCENT: 49.3 % (ref 36–66)
SODIUM BLD-SCNC: 138 MMOL/L (ref 135–145)
TOTAL IMMATURE NEUTOROPHIL: 0.01 K/CU MM
TOTAL NUCLEATED RBC: 0 K/CU MM
TOTAL PROTEIN: 6.9 GM/DL (ref 6.4–8.2)
WBC # BLD: 5 K/CU MM (ref 4–10.5)

## 2022-11-22 PROCEDURE — 80053 COMPREHEN METABOLIC PANEL: CPT

## 2022-11-22 PROCEDURE — 3074F SYST BP LT 130 MM HG: CPT | Performed by: NURSE PRACTITIONER

## 2022-11-22 PROCEDURE — 3078F DIAST BP <80 MM HG: CPT | Performed by: NURSE PRACTITIONER

## 2022-11-22 PROCEDURE — 80307 DRUG TEST PRSMV CHEM ANLYZR: CPT

## 2022-11-22 PROCEDURE — 93005 ELECTROCARDIOGRAM TRACING: CPT | Performed by: NURSE PRACTITIONER

## 2022-11-22 PROCEDURE — 99204 OFFICE O/P NEW MOD 45 MIN: CPT | Performed by: NURSE PRACTITIONER

## 2022-11-22 PROCEDURE — 36415 COLL VENOUS BLD VENIPUNCTURE: CPT

## 2022-11-22 PROCEDURE — 85025 COMPLETE CBC W/AUTO DIFF WBC: CPT

## 2022-11-22 ASSESSMENT — ENCOUNTER SYMPTOMS
RESPIRATORY NEGATIVE: 1
GASTROINTESTINAL NEGATIVE: 1
ALLERGIC/IMMUNOLOGIC NEGATIVE: 1
EYES NEGATIVE: 1

## 2022-11-22 NOTE — PROGRESS NOTES
Chief Complaint   Patient presents with    Weight Management     1st med wm  no bar cov          SUBJECTIVE:    HPI: Patient is here with complaints of weight gain and inability to lose weight per self. Inquiring about weight loss medications to assist with their weight loss goal.    Obesity with a BMI of Body mass index is 33.9 kg/m². Devora Barrientos Patient has failed to lose 5% of body weight for several years. Any history of glaucoma? DENIES    Any history of drug abuse? DENIES    Any history of CAD, uncontrolled HTN, arrhythmias, stroke, or CHF?? HR controlled with medication    Any history of hyperthyroidism? Denies    Any current or recent use of MAOIs? DENIES    Current dietary measures: tracking carbs and tries to eat healthy    Current exercise measures: not currently    I have reviewed the patient's(pertinent information to this visit) medical history, family history(scanned in  the [address] [place] [address] under \"patient questioner\"), social history and review of systems with the patient today in the office.           Past Surgical History:   Procedure Laterality Date    CHOLECYSTECTOMY  20014    HERNIA REPAIR      HYSTERECTOMY (CERVIX STATUS UNKNOWN)      HYSTERECTOMY, TOTAL ABDOMINAL (CERVIX REMOVED)      NASAL SINUS SURGERY  1991    OVARY REMOVAL         Past Medical History:   Diagnosis Date    History of CT scan (CTA) 02/08/2022    There is no significant coronary artery disease identified    Hypertension     Migraines        Family History   Problem Relation Age of Onset    Breast Cancer Paternal Grandmother     Diabetes Father     Cancer Maternal Grandmother         Non-Hodgkins lymphoma    Heart Attack Paternal Grandfather     Heart Disease Paternal Grandfather        Social History     Socioeconomic History    Marital status:      Spouse name: Not on file    Number of children: Not on file    Years of education: Not on file    Highest education level: Not on file   Occupational History    Not on file   Tobacco Use    Smoking status: Former     Packs/day: 1.00     Years: 10.00     Pack years: 10.00     Types: Cigarettes     Start date: 2005     Quit date: 3/19/2009     Years since quittin.6    Smokeless tobacco: Never    Tobacco comments:     I also smoked from  to    Vaping Use    Vaping Use: Never used   Substance and Sexual Activity    Alcohol use: Yes     Comment: I might have a few drinks a month    Drug use: No    Sexual activity: Yes     Partners: Male   Other Topics Concern    Not on file   Social History Narrative    Not on file     Social Determinants of Health     Financial Resource Strain: Unknown    Difficulty of Paying Living Expenses: Patient refused   Food Insecurity: Unknown    Worried About Running Out of Food in the Last Year: Patient refused    Ran Out of Food in the Last Year: Patient refused   Transportation Needs: Not on file   Physical Activity: Not on file   Stress: Not on file   Social Connections: Not on file   Intimate Partner Violence: Not on file   Housing Stability: Not on file       Current Outpatient Medications   Medication Sig Dispense Refill    estradiol (ESTRACE) 0.5 MG tablet Take 1 tablet by mouth daily 30 tablet 2    metoprolol succinate (TOPROL XL) 50 MG extended release tablet Take 1 tablet by mouth once daily 90 tablet 3    oxybutynin (DITROPAN XL) 10 mg extended release tablet Take 1 tablet by mouth daily 30 tablet 5    Cchufqlfcutu-Ylonyya-C-Probiot (AZO URINARY TRACT SUPPORT PO) Take 500 mg by mouth daily      Multiple Vitamin (MULTI-VITAMIN DAILY PO) Take by mouth      Cyanocobalamin (B-12 PO) Take 3,000 mcg by mouth daily      omeprazole (PRILOSEC) 20 MG delayed release capsule Take 1 capsule by mouth once daily 90 capsule 3    LORazepam (ATIVAN) 0.5 MG tablet Take 0.5 mg by mouth daily.       cetirizine (ZYRTEC) 10 MG tablet Take 1 tablet by mouth daily 90 tablet 1    butalbital-APAP-caffeine -40 MG CAPS per capsule Take 1 capsule by mouth every 6 hours as needed for Headaches 30 capsule 0    Biotin w/ Vitamins C & E (HAIR SKIN & NAILS GUMMIES PO) Take 2 each by mouth daily      phenazopyridine (PYRIDIUM) 100 MG tablet Take 1 tablet by mouth 3 times daily as needed for Pain (Patient not taking: Reported on 11/22/2022) 9 tablet 0    SUMAtriptan (IMITREX) 100 MG tablet Take 1 tablet by mouth as needed for Migraine 9 tablet 5     No current facility-administered medications for this visit. Allergies   Allergen Reactions    Vicodin [Hydrocodone-Acetaminophen]      Makes patient nauseated. Penicillins Other (See Comments)     Family allergy, never given  Mom has never had but strong family reaction. Sulfa Antibiotics Other (See Comments)     Family allergy, never given    Hydrocodone-Acetaminophen Nausea And Vomiting       Review of Systems:         Review of Systems   Constitutional: Negative. HENT: Negative. Eyes: Negative. Respiratory: Negative. Cardiovascular: Negative. Gastrointestinal: Negative. Endocrine: Negative. Genitourinary: Negative. Musculoskeletal: Negative. Skin: Negative. Allergic/Immunologic: Negative. Neurological: Negative. Hematological: Negative. Psychiatric/Behavioral: Negative. OBJECTIVE:  Physical Exam:    /80   Pulse 88   Ht 5' 3.5\" (1.613 m)   Wt 194 lb 6.4 oz (88.2 kg)   SpO2 99%   BMI 33.90 kg/m²      Physical Exam  Constitutional:       Appearance: Normal appearance. She is obese. HENT:      Head: Normocephalic. Nose: Nose normal.      Mouth/Throat:      Pharynx: Oropharynx is clear. Eyes:      Conjunctiva/sclera: Conjunctivae normal.      Pupils: Pupils are equal, round, and reactive to light. Cardiovascular:      Rate and Rhythm: Normal rate. Pulses: Normal pulses. Pulmonary:      Effort: Pulmonary effort is normal.   Abdominal:      Palpations: Abdomen is soft. Musculoskeletal:         General: Normal range of motion.       Cervical back: Normal range of motion. Skin:     General: Skin is warm and dry. Capillary Refill: Capillary refill takes less than 2 seconds. Neurological:      General: No focal deficit present. Mental Status: She is alert and oriented to person, place, and time. Psychiatric:         Mood and Affect: Mood normal.         Behavior: Behavior normal.         ASSESSMENT:  1. Obesity (BMI 30.0-34.9)  - Start tracking calories; about  9788-3138 daily  - 64 oz water daily  - Increase activity as able    2. Medication management  - Has taken Adipex in past without difficulty  - Previous cardiac workup reviewed  - Baseline workup ordered  - Will start Adipex next visit depending on results  - RTC 1-2 weeks    - Comprehensive Metabolic Panel; Future  - CBC with Auto Differential; Future  - Urine Drug Screen; Future  - EKG 12 Lead; Future      PLAN:    -Check EKG, UDS, CBC, and CMP prior to starting. Any abnormalities will be addressed prior to starting medication .    -Will plan on starting Adipex with next visit if Labs, UDS, and EKG WNL.     -BMI is over 30, or over 27 with weight-related risk factors.    -Pt aware Adipex can only be used short term (3 months). -Pt aware no breaks in treatment allowed or must be off for 6 months.    -Pt aware that weight must be lost at each visit or medication will be discontinued.     -Pt is aware that in order to be successful, must combine Adipex with appropriate diet and exercise plan. -Pt provided with medication handout that covers use; side effects (common side effects include insomnia, dry mouth, constipation, nervousness, increased heart rate, hypertension); precautions; and interactions.     -Pt aware must meet monthly in person while on this medication.     -Check [facility] Rx Reporting System.  Any concerns: NONE Reported     - Current birth control measures include: hysterectomy    -If elderly or renal impairment, will use lower dose (15 mg daily) and avoid all together if GFR is less than 15. N/A       Orders Placed This Encounter   Procedures    Comprehensive Metabolic Panel    CBC with Auto Differential    Urine Drug Screen    EKG 12 Lead        No orders of the defined types were placed in this encounter. Follow Up:  Return in about 1 month (around 12/22/2022).       Deya Borden, JORGE - CNP

## 2022-11-24 LAB
EKG ATRIAL RATE: 74 BPM
EKG DIAGNOSIS: NORMAL
EKG P AXIS: 45 DEGREES
EKG P-R INTERVAL: 188 MS
EKG Q-T INTERVAL: 414 MS
EKG QRS DURATION: 142 MS
EKG QTC CALCULATION (BAZETT): 459 MS
EKG R AXIS: -12 DEGREES
EKG T AXIS: 86 DEGREES
EKG VENTRICULAR RATE: 74 BPM

## 2022-11-24 PROCEDURE — 93010 ELECTROCARDIOGRAM REPORT: CPT | Performed by: INTERNAL MEDICINE

## 2022-11-27 DIAGNOSIS — R10.31 RLQ ABDOMINAL PAIN: ICD-10-CM

## 2022-11-28 RX ORDER — OXYBUTYNIN CHLORIDE 10 MG/1
TABLET, EXTENDED RELEASE ORAL
Qty: 30 TABLET | Refills: 12 | Status: SHIPPED | OUTPATIENT
Start: 2022-11-28

## 2022-11-28 RX ORDER — OMEPRAZOLE 20 MG/1
CAPSULE, DELAYED RELEASE ORAL
Qty: 90 CAPSULE | Refills: 3 | Status: SHIPPED | OUTPATIENT
Start: 2022-11-28

## 2022-12-07 ENCOUNTER — OFFICE VISIT (OUTPATIENT)
Dept: BARIATRICS/WEIGHT MGMT | Age: 42
End: 2022-12-07
Payer: COMMERCIAL

## 2022-12-07 VITALS
SYSTOLIC BLOOD PRESSURE: 122 MMHG | OXYGEN SATURATION: 98 % | HEART RATE: 68 BPM | HEIGHT: 64 IN | WEIGHT: 193.8 LBS | BODY MASS INDEX: 33.09 KG/M2 | DIASTOLIC BLOOD PRESSURE: 80 MMHG

## 2022-12-07 DIAGNOSIS — Z79.899 MEDICATION MANAGEMENT: Primary | ICD-10-CM

## 2022-12-07 DIAGNOSIS — E66.9 OBESITY (BMI 30.0-34.9): ICD-10-CM

## 2022-12-07 PROCEDURE — 3074F SYST BP LT 130 MM HG: CPT | Performed by: NURSE PRACTITIONER

## 2022-12-07 PROCEDURE — 3078F DIAST BP <80 MM HG: CPT | Performed by: NURSE PRACTITIONER

## 2022-12-07 PROCEDURE — 99213 OFFICE O/P EST LOW 20 MIN: CPT | Performed by: NURSE PRACTITIONER

## 2022-12-07 RX ORDER — PHENTERMINE HYDROCHLORIDE 37.5 MG/1
37.5 TABLET ORAL
Qty: 30 TABLET | Refills: 0 | Status: SHIPPED | OUTPATIENT
Start: 2022-12-07 | End: 2023-01-06

## 2022-12-07 NOTE — PROGRESS NOTES
Chief Complaint   Patient presents with    Weight Management     Plan adipex - testing results          SUBJECTIVE:    HPI: Patient is here with complaints of weight gain and inability to lose weight per self. Inquiring about weight loss medications to assist with their weight loss goal.    Obesity with a BMI of Body mass index is 33.79 kg/m². Martínez King Patient has failed to lose 5% of body weight for several years. Any history of glaucoma? Denies    Any history of drug abuse? Denies    Any history of CAD, uncontrolled HTN, arrhythmias, stroke, or CHF??  HR controlled with medication    Any history of hyperthyroidism? Denies    Any current or recent use of MAOIs? Denies    Current dietary measures: starting to track calories and make better food choices    Current exercise measures: not currently    I have reviewed the patient's(pertinent information to this visit) medical history, family history(scanned in  the [address] [place] [address] under \"patient questioner\"), social history and review of systems with the patient today in the office.           Past Surgical History:   Procedure Laterality Date    CHOLECYSTECTOMY  20014    HERNIA REPAIR      HYSTERECTOMY (CERVIX STATUS UNKNOWN)      HYSTERECTOMY, TOTAL ABDOMINAL (CERVIX REMOVED)      NASAL SINUS SURGERY  1991    OVARY REMOVAL         Past Medical History:   Diagnosis Date    History of CT scan (CTA) 02/08/2022    There is no significant coronary artery disease identified    Hypertension     Migraines        Family History   Problem Relation Age of Onset    Breast Cancer Paternal Grandmother     Diabetes Father     Cancer Maternal Grandmother         Non-Hodgkins lymphoma    Heart Attack Paternal Grandfather     Heart Disease Paternal Grandfather        Social History     Socioeconomic History    Marital status:      Spouse name: Not on file    Number of children: Not on file    Years of education: Not on file    Highest education level: Not on file   Occupational History Not on file   Tobacco Use    Smoking status: Former     Packs/day: 1.00     Years: 10.00     Pack years: 10.00     Types: Cigarettes     Start date: 2005     Quit date: 3/19/2009     Years since quittin.7    Smokeless tobacco: Never    Tobacco comments:     I also smoked from  to    Vaping Use    Vaping Use: Never used   Substance and Sexual Activity    Alcohol use: Yes     Comment: I might have a few drinks a month    Drug use: No    Sexual activity: Yes     Partners: Male   Other Topics Concern    Not on file   Social History Narrative    Not on file     Social Determinants of Health     Financial Resource Strain: Unknown    Difficulty of Paying Living Expenses: Patient refused   Food Insecurity: Unknown    Worried About Running Out of Food in the Last Year: Patient refused    Ran Out of Food in the Last Year: Patient refused   Transportation Needs: Not on file   Physical Activity: Not on file   Stress: Not on file   Social Connections: Not on file   Intimate Partner Violence: Not on file   Housing Stability: Not on file       Current Outpatient Medications   Medication Sig Dispense Refill    phentermine (ADIPEX-P) 37.5 MG tablet Take 1 tablet by mouth every morning (before breakfast) for 30 days. BMI 44. 30 tablet 0    oxybutynin (DITROPAN-XL) 10 MG extended release tablet Take 1 tablet by mouth once daily 30 tablet 12    omeprazole (PRILOSEC) 20 MG delayed release capsule Take 1 capsule by mouth once daily 90 capsule 3    estradiol (ESTRACE) 0.5 MG tablet Take 1 tablet by mouth daily 30 tablet 2    metoprolol succinate (TOPROL XL) 50 MG extended release tablet Take 1 tablet by mouth once daily 90 tablet 3    Rvfeksgxmzen-Ynlhzdc-A-Probiot (AZO URINARY TRACT SUPPORT PO) Take 500 mg by mouth daily      Multiple Vitamin (MULTI-VITAMIN DAILY PO) Take by mouth      Cyanocobalamin (B-12 PO) Take 3,000 mcg by mouth daily      LORazepam (ATIVAN) 0.5 MG tablet Take 0.5 mg by mouth daily.       cetirizine (ZYRTEC) 10 MG tablet Take 1 tablet by mouth daily 90 tablet 1    butalbital-APAP-caffeine -40 MG CAPS per capsule Take 1 capsule by mouth every 6 hours as needed for Headaches 30 capsule 0    SUMAtriptan (IMITREX) 100 MG tablet Take 1 tablet by mouth as needed for Migraine 9 tablet 5    Biotin w/ Vitamins C & E (HAIR SKIN & NAILS GUMMIES PO) Take 2 each by mouth daily      phenazopyridine (PYRIDIUM) 100 MG tablet Take 1 tablet by mouth 3 times daily as needed for Pain (Patient not taking: No sig reported) 9 tablet 0     No current facility-administered medications for this visit. Allergies   Allergen Reactions    Vicodin [Hydrocodone-Acetaminophen]      Makes patient nauseated. Penicillins Other (See Comments)     Family allergy, never given  Mom has never had but strong family reaction. Sulfa Antibiotics Other (See Comments)     Family allergy, never given    Hydrocodone-Acetaminophen Nausea And Vomiting       Review of Systems:         Review of Systems   All other systems reviewed and are negative. OBJECTIVE:  Physical Exam:    /80   Pulse 68   Ht 5' 3.5\" (1.613 m)   Wt 193 lb 12.8 oz (87.9 kg)   SpO2 98%   BMI 33.79 kg/m²      Physical Exam  Constitutional:       Appearance: Normal appearance. She is obese. HENT:      Head: Normocephalic. Nose: Nose normal.      Mouth/Throat:      Pharynx: Oropharynx is clear. Eyes:      Conjunctiva/sclera: Conjunctivae normal.      Pupils: Pupils are equal, round, and reactive to light. Cardiovascular:      Rate and Rhythm: Normal rate. Pulses: Normal pulses. Pulmonary:      Effort: Pulmonary effort is normal.   Abdominal:      Palpations: Abdomen is soft. Musculoskeletal:         General: Normal range of motion. Cervical back: Normal range of motion. Skin:     General: Skin is warm and dry. Capillary Refill: Capillary refill takes less than 2 seconds. Neurological:      General: No focal deficit present. Mental Status: She is alert and oriented to person, place, and time. Psychiatric:         Mood and Affect: Mood normal.         Behavior: Behavior normal.         ASSESSMENT:  1. Obesity (BMI 30.0-34.9)  - Start tracking calories; about 6555-4272 daily  - Increase water to 64 oz water daily  - Increase activity as able      2. Medication management  - Work up reviewed  - Meets criteria to start Adipex  - RX sent to pharmacy  - Start in am and take as directed  - Call for any HR changes, concerns,  or other side effects  - RTC one month    PLAN:    - Recent Labs, UDS, and EKG reviewed and WNL    - Patient will start on Adipex in am and take as directed    - Call for adverse side effects or concerns    - BMI is over 30, or over 27 with weight-related risk factors. - Pt aware Adipex can only be used short term (3 months). - Pt aware no breaks in treatment allowed or must be off for 6 months. - Pt aware that weight must be lost at each visit or medication will be discontinued. - Pt is aware that in order to be successful, must combine Adipex with appropriate diet and exercise plan. - Pt provided with medication handout that covers use; side effects (common side effects include insomnia, dry mouth, constipation, nervousness, increased heart rate, hypertension);    precautions; and interactions. - Pt aware must meet monthly in person while on this medication.     - Check [facility] Reporting System. Any concerns: NONE Reported     - Current birth control measures include: hysterectomy    - If elderly or renal impairment, will use lower dose (15 mg daily) and avoid all together if GFR is less than 15. N/A     - RTC in one month    No orders of the defined types were placed in this encounter. Orders Placed This Encounter   Medications    phentermine (ADIPEX-P) 37.5 MG tablet     Sig: Take 1 tablet by mouth every morning (before breakfast) for 30 days. BMI 44.      Dispense:  30 tablet Refill:  0          Follow Up:  Return in about 1 month (around 1/7/2023).       Fadia Petty, JORGE - CNP

## 2022-12-20 DIAGNOSIS — N95.1 MENOPAUSAL HOT FLUSHES: ICD-10-CM

## 2022-12-20 RX ORDER — ESTRADIOL 0.5 MG/1
0.5 TABLET ORAL DAILY
Qty: 30 TABLET | Refills: 5 | Status: SHIPPED | OUTPATIENT
Start: 2022-12-20

## 2023-01-11 ENCOUNTER — OFFICE VISIT (OUTPATIENT)
Dept: BARIATRICS/WEIGHT MGMT | Age: 43
End: 2023-01-11
Payer: COMMERCIAL

## 2023-01-11 VITALS
BODY MASS INDEX: 31.86 KG/M2 | WEIGHT: 186.6 LBS | SYSTOLIC BLOOD PRESSURE: 128 MMHG | HEIGHT: 64 IN | DIASTOLIC BLOOD PRESSURE: 72 MMHG | HEART RATE: 82 BPM | OXYGEN SATURATION: 100 %

## 2023-01-11 DIAGNOSIS — Z79.899 MEDICATION MANAGEMENT: Primary | ICD-10-CM

## 2023-01-11 DIAGNOSIS — E66.9 OBESITY (BMI 30.0-34.9): ICD-10-CM

## 2023-01-11 PROCEDURE — 3078F DIAST BP <80 MM HG: CPT | Performed by: NURSE PRACTITIONER

## 2023-01-11 PROCEDURE — 99213 OFFICE O/P EST LOW 20 MIN: CPT | Performed by: NURSE PRACTITIONER

## 2023-01-11 PROCEDURE — 3074F SYST BP LT 130 MM HG: CPT | Performed by: NURSE PRACTITIONER

## 2023-01-11 RX ORDER — PHENTERMINE HYDROCHLORIDE 37.5 MG/1
37.5 TABLET ORAL
Qty: 30 TABLET | Refills: 0 | Status: SHIPPED | OUTPATIENT
Start: 2023-01-11 | End: 2023-02-10

## 2023-01-11 RX ORDER — PHENTERMINE HYDROCHLORIDE 37.5 MG/1
37.5 CAPSULE ORAL EVERY MORNING
COMMUNITY

## 2023-01-11 ASSESSMENT — PATIENT HEALTH QUESTIONNAIRE - PHQ9
SUM OF ALL RESPONSES TO PHQ QUESTIONS 1-9: 0
SUM OF ALL RESPONSES TO PHQ9 QUESTIONS 1 & 2: 0
2. FEELING DOWN, DEPRESSED OR HOPELESS: 0
SUM OF ALL RESPONSES TO PHQ QUESTIONS 1-9: 0
SUM OF ALL RESPONSES TO PHQ QUESTIONS 1-9: 0
1. LITTLE INTEREST OR PLEASURE IN DOING THINGS: 0
SUM OF ALL RESPONSES TO PHQ QUESTIONS 1-9: 0

## 2023-01-11 NOTE — PROGRESS NOTES
Chief Complaint   Patient presents with    Weight Management     F/U- Medication wm visit. Adipex #2. No bar cov. SUBJECTIVE:    HPI: Patient is here with complaints of: Monthly Adipex visit. Obesity with a BMI of Body mass index is 32.54 kg/m². .    Current weight: 186 pounds    Weight change since last visit: -7.2 pounds (if pt failed to lose weight, cannot remain on medication). Month 2 of 3 of being on Adipex. Any new absolute contraindications (glaucoma, drug abuse, CAD, uncontrolled HTN, arrhythmias, stroke, CHF, hyperthyroidism, MAOI use, pregnant or breastfeeding) to being on medication: DENIES     Pt complains of the following side effects: DENIES    Current dietary measures: tracking calories daily with Lose it marce    Current exercise measures: stays active    I have reviewed the patient's(pertinent information to this visit) medical history, family history(scanned in  the [address] [place] [address] under \"patient questioner\"), social history and review of systems with the patient today in the office.           Past Surgical History:   Procedure Laterality Date    CHOLECYSTECTOMY  20014    HERNIA REPAIR      HYSTERECTOMY (CERVIX STATUS UNKNOWN)      HYSTERECTOMY, TOTAL ABDOMINAL (CERVIX REMOVED)      NASAL SINUS SURGERY  1991    OVARY REMOVAL         Past Medical History:   Diagnosis Date    History of CT scan (CTA) 02/08/2022    There is no significant coronary artery disease identified    Hypertension     Migraines        Family History   Problem Relation Age of Onset    Breast Cancer Paternal Grandmother     Diabetes Father     Cancer Maternal Grandmother         Non-Hodgkins lymphoma    Heart Attack Paternal Grandfather     Heart Disease Paternal Grandfather        Social History     Socioeconomic History    Marital status:      Spouse name: Not on file    Number of children: Not on file    Years of education: Not on file    Highest education level: Not on file   Occupational History    Not on file   Tobacco Use    Smoking status: Former     Packs/day: 1.00     Years: 10.00     Pack years: 10.00     Types: Cigarettes     Start date: 2005     Quit date: 3/19/2009     Years since quittin.8    Smokeless tobacco: Never    Tobacco comments:     I also smoked from  to    Vaping Use    Vaping Use: Never used   Substance and Sexual Activity    Alcohol use: Yes     Comment: I might have a few drinks a month    Drug use: No    Sexual activity: Yes     Partners: Male   Other Topics Concern    Not on file   Social History Narrative    Not on file     Social Determinants of Health     Financial Resource Strain: Unknown    Difficulty of Paying Living Expenses: Patient refused   Food Insecurity: Unknown    Worried About Running Out of Food in the Last Year: Patient refused    Ran Out of Food in the Last Year: Patient refused   Transportation Needs: Not on file   Physical Activity: Not on file   Stress: Not on file   Social Connections: Not on file   Intimate Partner Violence: Not on file   Housing Stability: Not on file       Current Outpatient Medications   Medication Sig Dispense Refill    phentermine (ADIPEX-P) 37.5 MG capsule Take 37.5 mg by mouth every morning. phentermine (ADIPEX-P) 37.5 MG tablet Take 1 tablet by mouth every morning (before breakfast) for 30 days. BMI 44.  Max Daily Amount: 37.5 mg 30 tablet 0    estradiol (ESTRACE) 0.5 MG tablet Take 1 tablet by mouth daily 30 tablet 5    oxybutynin (DITROPAN-XL) 10 MG extended release tablet Take 1 tablet by mouth once daily 30 tablet 12    omeprazole (PRILOSEC) 20 MG delayed release capsule Take 1 capsule by mouth once daily 90 capsule 3    metoprolol succinate (TOPROL XL) 50 MG extended release tablet Take 1 tablet by mouth once daily 90 tablet 3    Ramvglcgvgci-Vmpryve-M-Probiot (AZO URINARY TRACT SUPPORT PO) Take 500 mg by mouth daily      Multiple Vitamin (MULTI-VITAMIN DAILY PO) Take by mouth      Cyanocobalamin (B-12 PO) Take 3,000 mcg by mouth daily      LORazepam (ATIVAN) 0.5 MG tablet Take 0.5 mg by mouth daily. cetirizine (ZYRTEC) 10 MG tablet Take 1 tablet by mouth daily 90 tablet 1    butalbital-APAP-caffeine -40 MG CAPS per capsule Take 1 capsule by mouth every 6 hours as needed for Headaches 30 capsule 0    SUMAtriptan (IMITREX) 100 MG tablet Take 1 tablet by mouth as needed for Migraine 9 tablet 5    Biotin w/ Vitamins C & E (HAIR SKIN & NAILS GUMMIES PO) Take 2 each by mouth daily      phenazopyridine (PYRIDIUM) 100 MG tablet Take 1 tablet by mouth 3 times daily as needed for Pain (Patient not taking: No sig reported) 9 tablet 0     No current facility-administered medications for this visit. Allergies   Allergen Reactions    Vicodin [Hydrocodone-Acetaminophen]      Makes patient nauseated. Penicillins Other (See Comments)     Family allergy, never given  Mom has never had but strong family reaction. Sulfa Antibiotics Other (See Comments)     Family allergy, never given    Hydrocodone-Acetaminophen Nausea And Vomiting       Review of Systems:         Review of Systems   All other systems reviewed and are negative. OBJECTIVE:  Physical Exam:    /72 (Site: Left Upper Arm, Position: Sitting, Cuff Size: Large Adult)   Pulse 82   Ht 5' 3.5\" (1.613 m)   Wt 186 lb 9.6 oz (84.6 kg)   SpO2 100%   BMI 32.54 kg/m²      Physical Exam  Constitutional:       Appearance: Normal appearance. She is obese. HENT:      Head: Normocephalic. Nose: Nose normal.      Mouth/Throat:      Pharynx: Oropharynx is clear. Eyes:      Conjunctiva/sclera: Conjunctivae normal.      Pupils: Pupils are equal, round, and reactive to light. Cardiovascular:      Rate and Rhythm: Normal rate. Pulses: Normal pulses. Pulmonary:      Effort: Pulmonary effort is normal.   Abdominal:      Palpations: Abdomen is soft. Musculoskeletal:         General: Normal range of motion. Cervical back: Normal range of motion. Skin:     General: Skin is warm and dry. Capillary Refill: Capillary refill takes less than 2 seconds. Neurological:      General: No focal deficit present. Mental Status: She is alert and oriented to person, place, and time. Psychiatric:         Mood and Affect: Mood normal.         Behavior: Behavior normal.         ASSESSMENT:  1. Obesity (BMI 30.0-34.9)  - Continue tracking calories; about  2690-9780 daily  - 64 oz water daily  - Increase activity as able    2. Medication management  - Doing well; down 7.2 pounds since last visit  - Denies any side effects  - RX refill sent      PLAN:    -Continue Adipex. RX REFILLED.    -BMI is over 30, or over 27 with weight-related risk factors.     -Pt demonstrated weight loss since last visit.    -Pt aware Adipex can only be used short term (3 months). -Pt aware no breaks in treatment allowed or must be off for 6 months.    -Pt aware that weight must be lost at each visit or medication will be discontinued.     -Pt is aware that in order to be successful, must combine Adipex with appropriate diet and exercise plan. -Pt aware must continue to meet monthly in person while on this medication. F/u in one month. -Check [facility] Reporting System. Any concerns: NONE Reported     -Current birth control measures include: hysterectomy    -If elderly or renal impairment, will use lower dose (15 mg daily) and avoid all together if GFR is less than 15. N/A      No orders of the defined types were placed in this encounter. Orders Placed This Encounter   Medications    phentermine (ADIPEX-P) 37.5 MG tablet     Sig: Take 1 tablet by mouth every morning (before breakfast) for 30 days. BMI 44. Max Daily Amount: 37.5 mg     Dispense:  30 tablet     Refill:  0          Follow Up:  Return in about 1 month (around 2/11/2023).       Elizabeth Grazon, APRN - CNP

## 2023-02-05 DIAGNOSIS — Z79.899 MEDICATION MANAGEMENT: ICD-10-CM

## 2023-02-06 RX ORDER — PHENTERMINE HYDROCHLORIDE 37.5 MG/1
37.5 TABLET ORAL
Qty: 30 TABLET | Refills: 0 | OUTPATIENT
Start: 2023-02-06 | End: 2023-03-08

## 2023-02-07 ENCOUNTER — OFFICE VISIT (OUTPATIENT)
Dept: BARIATRICS/WEIGHT MGMT | Age: 43
End: 2023-02-07
Payer: COMMERCIAL

## 2023-02-07 VITALS
HEIGHT: 64 IN | HEART RATE: 74 BPM | SYSTOLIC BLOOD PRESSURE: 126 MMHG | BODY MASS INDEX: 31.26 KG/M2 | DIASTOLIC BLOOD PRESSURE: 74 MMHG | OXYGEN SATURATION: 99 % | WEIGHT: 183.1 LBS

## 2023-02-07 DIAGNOSIS — E66.9 OBESITY (BMI 30.0-34.9): ICD-10-CM

## 2023-02-07 DIAGNOSIS — Z79.899 MEDICATION MANAGEMENT: Primary | ICD-10-CM

## 2023-02-07 PROCEDURE — 3074F SYST BP LT 130 MM HG: CPT | Performed by: NURSE PRACTITIONER

## 2023-02-07 PROCEDURE — 99213 OFFICE O/P EST LOW 20 MIN: CPT | Performed by: NURSE PRACTITIONER

## 2023-02-07 PROCEDURE — 3078F DIAST BP <80 MM HG: CPT | Performed by: NURSE PRACTITIONER

## 2023-02-07 RX ORDER — PHENTERMINE HYDROCHLORIDE 37.5 MG/1
37.5 TABLET ORAL
Qty: 30 TABLET | Refills: 0 | Status: SHIPPED | OUTPATIENT
Start: 2023-02-07 | End: 2023-03-09

## 2023-02-07 ASSESSMENT — PATIENT HEALTH QUESTIONNAIRE - PHQ9
SUM OF ALL RESPONSES TO PHQ9 QUESTIONS 1 & 2: 0
SUM OF ALL RESPONSES TO PHQ9 QUESTIONS 1 & 2: 0
SUM OF ALL RESPONSES TO PHQ QUESTIONS 1-9: 0
2. FEELING DOWN, DEPRESSED OR HOPELESS: 0
1. LITTLE INTEREST OR PLEASURE IN DOING THINGS: 0
1. LITTLE INTEREST OR PLEASURE IN DOING THINGS: 0
2. FEELING DOWN, DEPRESSED OR HOPELESS: 0

## 2023-02-07 ASSESSMENT — ENCOUNTER SYMPTOMS: CONSTIPATION: 1

## 2023-02-07 NOTE — PROGRESS NOTES
Chief Complaint   Patient presents with    Weight Management     FU Medication WM visit Adipex # 3 No Bar Cov. SUBJECTIVE:    HPI: Patient is here with complaints of: Monthly Adipex visit. Obesity with a BMI of Body mass index is 31.93 kg/m². .    Current weight: 183 pounds    Weight change since last visit: -3.5 pounds (if pt failed to lose weight, cannot remain on medication). Month 3 of 3 of being on Adipex. Any new absolute contraindications (glaucoma, drug abuse, CAD, uncontrolled HTN, arrhythmias, stroke, CHF, hyperthyroidism, MAOI use, pregnant or breastfeeding) to being on medication: DENIES     Pt complains of the following side effects: DENIES    Current dietary measures: tracking calories most days    Current exercise measures: stays active    I have reviewed the patient's(pertinent information to this visit) medical history, family history(scanned in  the [address] [place] [address] under \"patient questioner\"), social history and review of systems with the patient today in the office.           Past Surgical History:   Procedure Laterality Date    CHOLECYSTECTOMY  20014    HERNIA REPAIR      HYSTERECTOMY (CERVIX STATUS UNKNOWN)      HYSTERECTOMY, TOTAL ABDOMINAL (CERVIX REMOVED)      NASAL SINUS SURGERY  1991    OVARY REMOVAL         Past Medical History:   Diagnosis Date    History of CT scan (CTA) 02/08/2022    There is no significant coronary artery disease identified    Hypertension     Migraines        Family History   Problem Relation Age of Onset    Breast Cancer Paternal Grandmother     Diabetes Father     Cancer Maternal Grandmother         Non-Hodgkins lymphoma    Heart Attack Paternal Grandfather     Heart Disease Paternal Grandfather        Social History     Socioeconomic History    Marital status:      Spouse name: Not on file    Number of children: Not on file    Years of education: Not on file    Highest education level: Not on file   Occupational History    Not on file   Tobacco Use Smoking status: Former     Packs/day: 1.00     Years: 10.00     Pack years: 10.00     Types: Cigarettes     Start date: 2005     Quit date: 3/19/2009     Years since quittin.8    Smokeless tobacco: Never    Tobacco comments:     I also smoked from  to    Vaping Use    Vaping Use: Never used   Substance and Sexual Activity    Alcohol use: Yes     Comment: I might have a few drinks a month    Drug use: No    Sexual activity: Yes     Partners: Male   Other Topics Concern    Not on file   Social History Narrative    Not on file     Social Determinants of Health     Financial Resource Strain: Unknown    Difficulty of Paying Living Expenses: Patient refused   Food Insecurity: Unknown    Worried About Running Out of Food in the Last Year: Patient refused    Ran Out of Food in the Last Year: Patient refused   Transportation Needs: Not on file   Physical Activity: Not on file   Stress: Not on file   Social Connections: Not on file   Intimate Partner Violence: Not on file   Housing Stability: Not on file       Current Outpatient Medications   Medication Sig Dispense Refill    phentermine (ADIPEX-P) 37.5 MG tablet Take 1 tablet by mouth every morning (before breakfast) for 30 days. BMI 44. Max Daily Amount: 37.5 mg 30 tablet 0    phentermine 37.5 MG capsule Take 37.5 mg by mouth every morning.       estradiol (ESTRACE) 0.5 MG tablet Take 1 tablet by mouth daily 30 tablet 5    oxybutynin (DITROPAN-XL) 10 MG extended release tablet Take 1 tablet by mouth once daily 30 tablet 12    omeprazole (PRILOSEC) 20 MG delayed release capsule Take 1 capsule by mouth once daily 90 capsule 3    metoprolol succinate (TOPROL XL) 50 MG extended release tablet Take 1 tablet by mouth once daily 90 tablet 3    Avimgupthszw-Dqnmvnd-D-Probiot (AZO URINARY TRACT SUPPORT PO) Take 500 mg by mouth daily      Multiple Vitamin (MULTI-VITAMIN DAILY PO) Take by mouth      Cyanocobalamin (B-12 PO) Take 3,000 mcg by mouth daily LORazepam (ATIVAN) 0.5 MG tablet Take 0.5 mg by mouth daily. cetirizine (ZYRTEC) 10 MG tablet Take 1 tablet by mouth daily 90 tablet 1    butalbital-APAP-caffeine -40 MG CAPS per capsule Take 1 capsule by mouth every 6 hours as needed for Headaches 30 capsule 0    SUMAtriptan (IMITREX) 100 MG tablet Take 1 tablet by mouth as needed for Migraine 9 tablet 5    Biotin w/ Vitamins C & E (HAIR SKIN & NAILS GUMMIES PO) Take 2 each by mouth daily      phentermine (ADIPEX-P) 37.5 MG tablet Take 1 tablet by mouth every morning (before breakfast) for 30 days. BMI 44. Max Daily Amount: 37.5 mg 30 tablet 0    phenazopyridine (PYRIDIUM) 100 MG tablet Take 1 tablet by mouth 3 times daily as needed for Pain (Patient not taking: No sig reported) 9 tablet 0     No current facility-administered medications for this visit. Allergies   Allergen Reactions    Vicodin [Hydrocodone-Acetaminophen]      Makes patient nauseated. Penicillins Other (See Comments)     Family allergy, never given  Mom has never had but strong family reaction. Sulfa Antibiotics Other (See Comments)     Family allergy, never given    Hydrocodone-Acetaminophen Nausea And Vomiting       Review of Systems:         Review of Systems   Gastrointestinal:  Positive for constipation. All other systems reviewed and are negative. OBJECTIVE:  Physical Exam:    /74 (Site: Right Upper Arm, Position: Sitting, Cuff Size: Medium Adult)   Pulse 74   Ht 5' 3.5\" (1.613 m)   Wt 183 lb 1.6 oz (83.1 kg)   SpO2 99%   BMI 31.93 kg/m²      Physical Exam  Constitutional:       Appearance: Normal appearance. She is obese. HENT:      Head: Normocephalic. Nose: Nose normal.      Mouth/Throat:      Pharynx: Oropharynx is clear. Eyes:      Conjunctiva/sclera: Conjunctivae normal.      Pupils: Pupils are equal, round, and reactive to light. Cardiovascular:      Rate and Rhythm: Normal rate. Pulses: Normal pulses.    Pulmonary:      Effort: Pulmonary effort is normal.   Musculoskeletal:         General: Normal range of motion. Cervical back: Normal range of motion. Skin:     General: Skin is warm and dry. Capillary Refill: Capillary refill takes less than 2 seconds. Neurological:      General: No focal deficit present. Mental Status: She is alert and oriented to person, place, and time. Psychiatric:         Mood and Affect: Mood normal.         Behavior: Behavior normal.         ASSESSMENT:  1. Obesity (BMI 30.0-34.9)  - Continue tracking calories; about  9581-8037 daily  - continue 64 oz water daily  - Increase activity as able    2. Medication management  - Doing well; down additional 3.5 pounds since last visit  - Denies any side effects  - RX refill sent for last month of Adipex  - Wean off last 4 days of therapy- take 1/2 dose every other day until completed  - Possible transition to Qsymia next month depending upon BMI  - BMI currently 31.93  - Call for any concerns  - RTC one month      PLAN:    -Continue Adipex. RX REFILLED.    -BMI is over 30, or over 27 with weight-related risk factors.     -Pt demonstrated weight loss since last visit.    -Pt aware Adipex can only be used short term (3 months). -Pt aware no breaks in treatment allowed or must be off for 6 months.    -Pt aware that weight must be lost at each visit or medication will be discontinued.     -Pt is aware that in order to be successful, must combine Adipex with appropriate diet and exercise plan. -Pt aware must continue to meet monthly in person while on this medication. F/u in one month. -Check [facility] Reporting System. Any concerns: NONE Reported     -Current birth control measures include: hysterectomy    -If elderly or renal impairment, will use lower dose (15 mg daily) and avoid all together if GFR is less than 15. N/A      No orders of the defined types were placed in this encounter.        Orders Placed This Encounter   Medications phentermine (ADIPEX-P) 37.5 MG tablet     Sig: Take 1 tablet by mouth every morning (before breakfast) for 30 days. BMI 44. Max Daily Amount: 37.5 mg     Dispense:  30 tablet     Refill:  0          Follow Up:  Return in about 1 month (around 3/7/2023).       Shikha Dougherty, JORGE - CNP

## 2023-03-06 ENCOUNTER — OFFICE VISIT (OUTPATIENT)
Dept: BARIATRICS/WEIGHT MGMT | Age: 43
End: 2023-03-06
Payer: COMMERCIAL

## 2023-03-06 VITALS
BODY MASS INDEX: 30.71 KG/M2 | SYSTOLIC BLOOD PRESSURE: 126 MMHG | WEIGHT: 179.9 LBS | DIASTOLIC BLOOD PRESSURE: 84 MMHG | HEIGHT: 64 IN

## 2023-03-06 DIAGNOSIS — Z79.899 MEDICATION MANAGEMENT: Primary | ICD-10-CM

## 2023-03-06 DIAGNOSIS — E66.9 OBESITY (BMI 30.0-34.9): ICD-10-CM

## 2023-03-06 PROCEDURE — 99212 OFFICE O/P EST SF 10 MIN: CPT | Performed by: NURSE PRACTITIONER

## 2023-03-06 PROCEDURE — 3074F SYST BP LT 130 MM HG: CPT | Performed by: NURSE PRACTITIONER

## 2023-03-06 PROCEDURE — 3079F DIAST BP 80-89 MM HG: CPT | Performed by: NURSE PRACTITIONER

## 2023-03-06 ASSESSMENT — PATIENT HEALTH QUESTIONNAIRE - PHQ9
SUM OF ALL RESPONSES TO PHQ9 QUESTIONS 1 & 2: 0
SUM OF ALL RESPONSES TO PHQ QUESTIONS 1-9: 0
2. FEELING DOWN, DEPRESSED OR HOPELESS: 0
SUM OF ALL RESPONSES TO PHQ QUESTIONS 1-9: 0
SUM OF ALL RESPONSES TO PHQ QUESTIONS 1-9: 0
1. LITTLE INTEREST OR PLEASURE IN DOING THINGS: 0
SUM OF ALL RESPONSES TO PHQ QUESTIONS 1-9: 0

## 2023-03-06 NOTE — PROGRESS NOTES
Chief Complaint   Patient presents with    Follow-up     Med wm adipex completed discuss trans meds to Qsymia         SUBJECTIVE:    HPI: Patient is here with complaints of: Monthly Adipex visit. Obesity with a BMI of Body mass index is 31.37 kg/m². .    Current weight: 179 pounds    Weight change since last visit: -3.2 pounds (if pt failed to lose weight, cannot remain on medication). Adipex therapy completed without side effects. Any new absolute contraindications (glaucoma, drug abuse, CAD, uncontrolled HTN, arrhythmias, stroke, CHF, hyperthyroidism, MAOI use, pregnant or breastfeeding) to being on medication: DENIES     Pt complains of the following side effects: DENIES    Current dietary measures: portion control    Current exercise measures: stays active; might go to gym soon    I have reviewed the patient's(pertinent information to this visit) medical history, family history(scanned in  the [address] [place] [address] under \"patient questioner\"), social history and review of systems with the patient today in the office.           Past Surgical History:   Procedure Laterality Date    CHOLECYSTECTOMY  20014    HERNIA REPAIR      HYSTERECTOMY (CERVIX STATUS UNKNOWN)      HYSTERECTOMY, TOTAL ABDOMINAL (CERVIX REMOVED)      NASAL SINUS SURGERY  1991    OVARY REMOVAL         Past Medical History:   Diagnosis Date    History of CT scan (CTA) 02/08/2022    There is no significant coronary artery disease identified    Hypertension     Migraines        Family History   Problem Relation Age of Onset    Breast Cancer Paternal Grandmother     Diabetes Father     Cancer Maternal Grandmother         Non-Hodgkins lymphoma    Heart Attack Paternal Grandfather     Heart Disease Paternal Grandfather        Social History     Socioeconomic History    Marital status:      Spouse name: Not on file    Number of children: Not on file    Years of education: Not on file    Highest education level: Not on file   Occupational History    Not on file   Tobacco Use    Smoking status: Former     Packs/day: 1.00     Years: 10.00     Pack years: 10.00     Types: Cigarettes     Start date: 2005     Quit date: 3/19/2009     Years since quittin.9    Smokeless tobacco: Never    Tobacco comments:     I also smoked from  to    Vaping Use    Vaping Use: Never used   Substance and Sexual Activity    Alcohol use: Yes     Comment: I might have a few drinks a month    Drug use: No    Sexual activity: Yes     Partners: Male   Other Topics Concern    Not on file   Social History Narrative    Not on file     Social Determinants of Health     Financial Resource Strain: Unknown    Difficulty of Paying Living Expenses: Patient refused   Food Insecurity: Unknown    Worried About Running Out of Food in the Last Year: Patient refused    Ran Out of Food in the Last Year: Patient refused   Transportation Needs: Not on file   Physical Activity: Not on file   Stress: Not on file   Social Connections: Not on file   Intimate Partner Violence: Not on file   Housing Stability: Not on file       Current Outpatient Medications   Medication Sig Dispense Refill    phentermine (ADIPEX-P) 37.5 MG tablet Take 1 tablet by mouth every morning (before breakfast) for 30 days. BMI 44. Max Daily Amount: 37.5 mg 30 tablet 0    phentermine 37.5 MG capsule Take 37.5 mg by mouth every morning.       estradiol (ESTRACE) 0.5 MG tablet Take 1 tablet by mouth daily 30 tablet 5    oxybutynin (DITROPAN-XL) 10 MG extended release tablet Take 1 tablet by mouth once daily 30 tablet 12    omeprazole (PRILOSEC) 20 MG delayed release capsule Take 1 capsule by mouth once daily 90 capsule 3    metoprolol succinate (TOPROL XL) 50 MG extended release tablet Take 1 tablet by mouth once daily 90 tablet 3    Vzjpjvficrfc-Hvesyyf-W-Probiot (AZO URINARY TRACT SUPPORT PO) Take 500 mg by mouth daily      Multiple Vitamin (MULTI-VITAMIN DAILY PO) Take by mouth      Cyanocobalamin (B-12 PO) Take 3,000 mcg by mouth daily      LORazepam (ATIVAN) 0.5 MG tablet Take 0.5 mg by mouth daily. cetirizine (ZYRTEC) 10 MG tablet Take 1 tablet by mouth daily 90 tablet 1    butalbital-APAP-caffeine -40 MG CAPS per capsule Take 1 capsule by mouth every 6 hours as needed for Headaches 30 capsule 0    SUMAtriptan (IMITREX) 100 MG tablet Take 1 tablet by mouth as needed for Migraine 9 tablet 5    Biotin w/ Vitamins C & E (HAIR SKIN & NAILS GUMMIES PO) Take 2 each by mouth daily      phenazopyridine (PYRIDIUM) 100 MG tablet Take 1 tablet by mouth 3 times daily as needed for Pain (Patient not taking: No sig reported) 9 tablet 0     No current facility-administered medications for this visit. Allergies   Allergen Reactions    Vicodin [Hydrocodone-Acetaminophen]      Makes patient nauseated. Penicillins Other (See Comments)     Family allergy, never given  Mom has never had but strong family reaction. Sulfa Antibiotics Other (See Comments)     Family allergy, never given    Hydrocodone-Acetaminophen Nausea And Vomiting       Review of Systems:         Review of Systems   All other systems reviewed and are negative. OBJECTIVE:  Physical Exam:    /84 (Site: Right Upper Arm, Position: Sitting, Cuff Size: Large Adult)   Ht 5' 3.5\" (1.613 m)   Wt 179 lb 14.4 oz (81.6 kg)   BMI 31.37 kg/m²      Physical Exam  Constitutional:       Appearance: Normal appearance. She is obese. HENT:      Head: Normocephalic. Nose: Nose normal.      Mouth/Throat:      Pharynx: Oropharynx is clear. Eyes:      Conjunctiva/sclera: Conjunctivae normal.      Pupils: Pupils are equal, round, and reactive to light. Cardiovascular:      Rate and Rhythm: Normal rate. Pulses: Normal pulses. Pulmonary:      Effort: Pulmonary effort is normal.   Musculoskeletal:         General: Normal range of motion. Cervical back: Normal range of motion. Skin:     General: Skin is warm and dry.       Capillary Refill: Capillary refill takes less than 2 seconds. Neurological:      General: No focal deficit present. Mental Status: She is alert and oriented to person, place, and time. Psychiatric:         Mood and Affect: Mood normal.         Behavior: Behavior normal.         ASSESSMENT:  1. Obesity (BMI 30.0-34.9)  - TRY to  track calories consistently; about  6718-8725 daily  - Continue 64 oz water daily  - Increase activity as able    2. Medication management  - Adipex therapy completed without any side effects  - BMI 31.3  - Not interested in Qsymia at this time  - Would like to practice mindful eating  - Will call office and make appt if wants transition medication  - RTC as needed for weight management        No orders of the defined types were placed in this encounter. No orders of the defined types were placed in this encounter. Follow Up:  Return for As needed for weight management.       JORGE Khan - CNP

## 2023-04-18 ENCOUNTER — TELEPHONE (OUTPATIENT)
Dept: FAMILY MEDICINE CLINIC | Age: 43
End: 2023-04-18

## 2023-04-18 DIAGNOSIS — L20.82 FLEXURAL ECZEMA: Primary | ICD-10-CM

## 2023-04-18 RX ORDER — HALOBETASOL PROPIONATE 0.05 %
OINTMENT (GRAM) TOPICAL
Qty: 50 G | Refills: 1 | Status: SHIPPED | OUTPATIENT
Start: 2023-04-18

## 2023-04-18 NOTE — TELEPHONE ENCOUNTER
Patient with red itchy rash behind left ear x months. Topical steroid will help for a while but it recurs. No scalp lesions. Not on other side. Med sent as in chart, patient aware. See back if not better.

## 2023-09-22 ENCOUNTER — HOSPITAL ENCOUNTER (OUTPATIENT)
Dept: MAMMOGRAPHY | Age: 43
Discharge: HOME | End: 2023-09-22
Payer: COMMERCIAL

## 2023-09-22 VITALS — HEIGHT: 64 IN | WEIGHT: 185 LBS | BODY MASS INDEX: 31.58 KG/M2

## 2023-09-22 DIAGNOSIS — Z12.31 ENCOUNTER FOR SCREENING MAMMOGRAM FOR MALIGNANT NEOPLASM OF BREAST: ICD-10-CM

## 2023-09-22 PROCEDURE — 77063 BREAST TOMOSYNTHESIS BI: CPT

## 2023-10-12 DIAGNOSIS — I10 PRIMARY HYPERTENSION: ICD-10-CM

## 2023-10-12 RX ORDER — METOPROLOL SUCCINATE 50 MG/1
TABLET, EXTENDED RELEASE ORAL
Qty: 90 TABLET | Refills: 3 | Status: SHIPPED | OUTPATIENT
Start: 2023-10-12

## 2023-11-29 DIAGNOSIS — R10.31 RLQ ABDOMINAL PAIN: ICD-10-CM

## 2023-11-29 RX ORDER — OMEPRAZOLE 20 MG/1
CAPSULE, DELAYED RELEASE ORAL
Qty: 90 CAPSULE | Refills: 3 | Status: SHIPPED | OUTPATIENT
Start: 2023-11-29

## 2023-11-29 NOTE — TELEPHONE ENCOUNTER
Medicine)  Abigail Torres MD as PCP - Empaneled Provider  Kodak Dumont PA-C as Physician Assistant (Urology)     Requested Pharmacy____________________________________________________________________

## 2024-01-06 DIAGNOSIS — N95.1 MENOPAUSAL HOT FLUSHES: ICD-10-CM

## 2024-01-08 RX ORDER — OXYBUTYNIN CHLORIDE 10 MG/1
10 TABLET, EXTENDED RELEASE ORAL DAILY
Qty: 30 TABLET | Refills: 5 | Status: SHIPPED | OUTPATIENT
Start: 2024-01-08

## 2024-01-08 RX ORDER — ESTRADIOL 0.5 MG/1
0.5 TABLET ORAL DAILY
Qty: 30 TABLET | Refills: 5 | Status: SHIPPED | OUTPATIENT
Start: 2024-01-08

## 2024-01-08 NOTE — TELEPHONE ENCOUNTER
Last appointment: 9/14/2022   Next Appointment: Visit date not found     Allergies:  Allergies   Allergen Reactions    Vicodin [Hydrocodone-Acetaminophen]      Makes patient nauseated.    Penicillins Other (See Comments)     Family allergy, never given  Mom has never had but strong family reaction.     Sulfa Antibiotics Other (See Comments)     Family allergy, never given    Hydrocodone-Acetaminophen Nausea And Vomiting         Recent Vitals:  Wt Readings from Last 3 Encounters:   09/22/23 83.9 kg (185 lb)   03/06/23 81.6 kg (179 lb 14.4 oz)   02/07/23 83.1 kg (183 lb 1.6 oz)     Ht Readings from Last 3 Encounters:   09/22/23 1.626 m (5' 4\")   03/06/23 1.613 m (5' 3.5\")   02/07/23 1.613 m (5' 3.5\")     BP Readings from Last 3 Encounters:   03/06/23 126/84   02/07/23 126/74   01/11/23 128/72     BMI Readings from Last 3 Encounters:   09/22/23 31.76 kg/m²   03/06/23 31.37 kg/m²   02/07/23 31.93 kg/m²       Recent Labs__________________________________________________________________________    Renal:   Creatinine   Date Value Ref Range Status   11/22/2022 0.7 0.6 - 1.1 MG/DL Final     BUN   Date Value Ref Range Status   11/22/2022 13 6 - 23 MG/DL Final     Sodium   Date Value Ref Range Status   11/22/2022 138 135 - 145 MMOL/L Final     Potassium   Date Value Ref Range Status   11/22/2022 3.5 3.5 - 5.1 MMOL/L Final     Chloride   Date Value Ref Range Status   11/22/2022 100 99 - 110 mMol/L Final     CO2   Date Value Ref Range Status   11/22/2022 27 21 - 32 MMOL/L Final       BMP:    Sodium   Date Value Ref Range Status   11/22/2022 138 135 - 145 MMOL/L Final     Potassium   Date Value Ref Range Status   11/22/2022 3.5 3.5 - 5.1 MMOL/L Final     Chloride   Date Value Ref Range Status   11/22/2022 100 99 - 110 mMol/L Final     CO2   Date Value Ref Range Status   11/22/2022 27 21 - 32 MMOL/L Final     BUN   Date Value Ref Range Status   11/22/2022 13 6 - 23 MG/DL Final     Creatinine   Date Value Ref Range Status

## 2024-01-30 ENCOUNTER — TELEPHONE (OUTPATIENT)
Dept: FAMILY MEDICINE CLINIC | Age: 44
End: 2024-01-30

## 2024-01-30 RX ORDER — ONDANSETRON 4 MG/1
4 TABLET, ORALLY DISINTEGRATING ORAL 3 TIMES DAILY PRN
Qty: 30 TABLET | Refills: 0 | Status: SHIPPED | OUTPATIENT
Start: 2024-01-30

## 2024-05-20 ENCOUNTER — TELEPHONE (OUTPATIENT)
Dept: FAMILY MEDICINE CLINIC | Age: 44
End: 2024-05-20

## 2024-05-20 DIAGNOSIS — R05.1 ACUTE COUGH: Primary | ICD-10-CM

## 2024-05-20 RX ORDER — METHYLPREDNISOLONE 4 MG/1
TABLET ORAL
Qty: 1 KIT | Refills: 0 | Status: SHIPPED | OUTPATIENT
Start: 2024-05-20

## 2024-05-20 RX ORDER — ACETAMINOPHEN AND CODEINE PHOSPHATE 300; 30 MG/1; MG/1
1-2 TABLET ORAL EVERY 8 HOURS PRN
Qty: 18 TABLET | Refills: 0 | Status: SHIPPED | OUTPATIENT
Start: 2024-05-20 | End: 2024-05-23

## 2024-05-20 NOTE — TELEPHONE ENCOUNTER
Radhika reports starting with URI symptoms and hoarseness and cough and headache last week, starting Friday afternoon.  She has no purulence and no fever and suspects she may have viral illness.  I would concur.  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    I agreed to send medications as attached, Medrol and Tylenol 3 for cough to her pharmacy.  She is aware and will pick those up on the way home.  If not doing better we can see her in the office.

## 2024-05-30 ENCOUNTER — OFFICE VISIT (OUTPATIENT)
Dept: FAMILY MEDICINE CLINIC | Age: 44
End: 2024-05-30
Payer: COMMERCIAL

## 2024-05-30 VITALS
OXYGEN SATURATION: 97 % | DIASTOLIC BLOOD PRESSURE: 84 MMHG | BODY MASS INDEX: 29.87 KG/M2 | WEIGHT: 174 LBS | HEART RATE: 90 BPM | SYSTOLIC BLOOD PRESSURE: 118 MMHG

## 2024-05-30 DIAGNOSIS — R53.83 OTHER FATIGUE: ICD-10-CM

## 2024-05-30 DIAGNOSIS — Z87.898 HISTORY OF PREDIABETES: ICD-10-CM

## 2024-05-30 DIAGNOSIS — J30.1 ALLERGIC RHINITIS DUE TO POLLEN, UNSPECIFIED SEASONALITY: ICD-10-CM

## 2024-05-30 DIAGNOSIS — Z86.2 HISTORY OF ANEMIA: ICD-10-CM

## 2024-05-30 DIAGNOSIS — I10 PRIMARY HYPERTENSION: ICD-10-CM

## 2024-05-30 DIAGNOSIS — Z00.00 WELL ADULT EXAM: Primary | ICD-10-CM

## 2024-05-30 DIAGNOSIS — Z86.39 HISTORY OF VITAMIN D DEFICIENCY: ICD-10-CM

## 2024-05-30 DIAGNOSIS — Z12.11 SCREEN FOR COLON CANCER: ICD-10-CM

## 2024-05-30 DIAGNOSIS — G43.109 MIGRAINE EQUIVALENT SYNDROME: ICD-10-CM

## 2024-05-30 LAB
25(OH)D3 SERPL-MCNC: 55.5 NG/ML
ALBUMIN SERPL-MCNC: 4.2 G/DL (ref 3.4–5)
ALBUMIN/GLOB SERPL: 1.8 {RATIO} (ref 1.1–2.2)
ALP SERPL-CCNC: 92 U/L (ref 40–129)
ALT SERPL-CCNC: 13 U/L (ref 10–40)
ANION GAP SERPL CALCULATED.3IONS-SCNC: 10 MMOL/L (ref 3–16)
AST SERPL-CCNC: 13 U/L (ref 15–37)
BILIRUB SERPL-MCNC: 0.5 MG/DL (ref 0–1)
BUN SERPL-MCNC: 11 MG/DL (ref 7–20)
CALCIUM SERPL-MCNC: 9.4 MG/DL (ref 8.3–10.6)
CHLORIDE SERPL-SCNC: 102 MMOL/L (ref 99–110)
CHOLEST SERPL-MCNC: 199 MG/DL (ref 0–199)
CO2 SERPL-SCNC: 26 MMOL/L (ref 21–32)
CREAT SERPL-MCNC: 0.8 MG/DL (ref 0.6–1.1)
DEPRECATED RDW RBC AUTO: 13.5 % (ref 12.4–15.4)
GFR SERPLBLD CREATININE-BSD FMLA CKD-EPI: >90 ML/MIN/{1.73_M2}
GLUCOSE P FAST SERPL-MCNC: 87 MG/DL (ref 70–99)
HCT VFR BLD AUTO: 38.6 % (ref 36–48)
HDLC SERPL-MCNC: 42 MG/DL (ref 40–60)
HGB BLD-MCNC: 13.4 G/DL (ref 12–16)
LDL CHOLESTEROL: 133 MG/DL
MCH RBC QN AUTO: 29.6 PG (ref 26–34)
MCHC RBC AUTO-ENTMCNC: 34.6 G/DL (ref 31–36)
MCV RBC AUTO: 85.5 FL (ref 80–100)
PLATELET # BLD AUTO: 348 K/UL (ref 135–450)
PMV BLD AUTO: 7.6 FL (ref 5–10.5)
POTASSIUM SERPL-SCNC: 4.1 MMOL/L (ref 3.5–5.1)
PROT SERPL-MCNC: 6.6 G/DL (ref 6.4–8.2)
RBC # BLD AUTO: 4.52 M/UL (ref 4–5.2)
SODIUM SERPL-SCNC: 138 MMOL/L (ref 136–145)
TRIGL SERPL-MCNC: 122 MG/DL (ref 0–150)
TSH SERPL DL<=0.005 MIU/L-ACNC: 0.69 UIU/ML (ref 0.27–4.2)
VLDLC SERPL CALC-MCNC: 24 MG/DL
WBC # BLD AUTO: 5.9 K/UL (ref 4–11)

## 2024-05-30 PROCEDURE — 99396 PREV VISIT EST AGE 40-64: CPT | Performed by: FAMILY MEDICINE

## 2024-05-30 PROCEDURE — 3074F SYST BP LT 130 MM HG: CPT | Performed by: FAMILY MEDICINE

## 2024-05-30 PROCEDURE — 3079F DIAST BP 80-89 MM HG: CPT | Performed by: FAMILY MEDICINE

## 2024-05-30 PROCEDURE — 36415 COLL VENOUS BLD VENIPUNCTURE: CPT | Performed by: FAMILY MEDICINE

## 2024-05-30 RX ORDER — SUMATRIPTAN 100 MG/1
100 TABLET, FILM COATED ORAL PRN
Qty: 9 TABLET | Refills: 5 | Status: SHIPPED | OUTPATIENT
Start: 2024-05-30

## 2024-05-30 RX ORDER — MONTELUKAST SODIUM 10 MG/1
10 TABLET ORAL NIGHTLY
Qty: 30 TABLET | Refills: 12 | Status: SHIPPED | OUTPATIENT
Start: 2024-05-30

## 2024-05-30 ASSESSMENT — ENCOUNTER SYMPTOMS
ABDOMINAL PAIN: 0
BACK PAIN: 0
WHEEZING: 0
SHORTNESS OF BREATH: 0
COUGH: 0
CHEST TIGHTNESS: 0

## 2024-05-30 ASSESSMENT — PATIENT HEALTH QUESTIONNAIRE - PHQ9
2. FEELING DOWN, DEPRESSED OR HOPELESS: NOT AT ALL
SUM OF ALL RESPONSES TO PHQ9 QUESTIONS 1 & 2: 0
SUM OF ALL RESPONSES TO PHQ QUESTIONS 1-9: 0
SUM OF ALL RESPONSES TO PHQ QUESTIONS 1-9: 0
1. LITTLE INTEREST OR PLEASURE IN DOING THINGS: NOT AT ALL
SUM OF ALL RESPONSES TO PHQ QUESTIONS 1-9: 0
SUM OF ALL RESPONSES TO PHQ QUESTIONS 1-9: 0

## 2024-05-30 NOTE — PROGRESS NOTES
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  She is also having work stress.    Patient is established unless otherwise noted.  Above chief complaint and [place] obtained by this physician provider.     Review of Systems   Constitutional:  Negative for activity change, chills, fatigue and fever.   HENT:  Negative for congestion.    Respiratory:  Negative for cough, chest tightness, shortness of breath and wheezing.    Cardiovascular:  Negative for chest pain and leg swelling.   Gastrointestinal:  Negative for abdominal pain.   Musculoskeletal:  Negative for back pain and myalgias.   Skin:  Negative for rash.   Psychiatric/Behavioral:  Negative for behavioral problems and dysphoric mood.        Allergies   Allergen Reactions    Vicodin [Hydrocodone-Acetaminophen]      Makes patient nauseated.    Penicillins Other (See Comments)     Family allergy, never given  Mom has never had but strong family reaction.     Sulfa Antibiotics Other (See Comments)     Family allergy, never given    Hydrocodone-Acetaminophen Nausea And Vomiting     Allergy historyupdated.    Outpatient Medications Marked as Taking for the 5/30/24 encounter (Office Visit) with Sharee Wilcox MD   Medication Sig Dispense Refill    montelukast

## 2024-05-31 NOTE — RESULT ENCOUNTER NOTE
Recent results are looking good everywhere, cholesterol profile is acceptable with no intervention needed.  Metabolic panel all normal except for slightly low AST, of course not really significant.  Vitamin D is very good and much better than it has been in the past.  Blood count is all normal and thyroid is also normal.    Not routed for staff call but released to [facility] only.

## 2024-06-23 DIAGNOSIS — N95.1 MENOPAUSAL HOT FLUSHES: ICD-10-CM

## 2024-06-24 RX ORDER — ESTRADIOL 0.5 MG/1
0.5 TABLET ORAL DAILY
Qty: 30 TABLET | Refills: 11 | Status: SHIPPED | OUTPATIENT
Start: 2024-06-24

## 2024-06-24 RX ORDER — OXYBUTYNIN CHLORIDE 10 MG/1
10 TABLET, EXTENDED RELEASE ORAL DAILY
Qty: 30 TABLET | Refills: 11 | Status: SHIPPED | OUTPATIENT
Start: 2024-06-24

## 2024-06-24 NOTE — TELEPHONE ENCOUNTER
Last appointment: 5/30/2024   Next Appointment: Visit date not found     Allergies:  Allergies   Allergen Reactions    Vicodin [Hydrocodone-Acetaminophen]      Makes patient nauseated.    Penicillins Other (See Comments)     Family allergy, never given  Mom has never had but strong family reaction.     Sulfa Antibiotics Other (See Comments)     Family allergy, never given    Hydrocodone-Acetaminophen Nausea And Vomiting         Recent Vitals:  Wt Readings from Last 3 Encounters:   05/30/24 78.9 kg (174 lb)   09/22/23 83.9 kg (185 lb)   03/06/23 81.6 kg (179 lb 14.4 oz)     Ht Readings from Last 3 Encounters:   09/22/23 1.626 m (5' 4\")   03/06/23 1.613 m (5' 3.5\")   02/07/23 1.613 m (5' 3.5\")     BP Readings from Last 3 Encounters:   05/30/24 118/84   03/06/23 126/84   02/07/23 126/74     BMI Readings from Last 3 Encounters:   05/30/24 29.87 kg/m²   09/22/23 31.76 kg/m²   03/06/23 31.37 kg/m²       Recent Labs__________________________________________________________________________    Renal:   Creatinine   Date Value Ref Range Status   05/30/2024 0.8 0.6 - 1.1 mg/dL Final     BUN   Date Value Ref Range Status   05/30/2024 11 7 - 20 mg/dL Final     Sodium   Date Value Ref Range Status   05/30/2024 138 136 - 145 mmol/L Final     Potassium   Date Value Ref Range Status   05/30/2024 4.1 3.5 - 5.1 mmol/L Final     Chloride   Date Value Ref Range Status   05/30/2024 102 99 - 110 mmol/L Final     CO2   Date Value Ref Range Status   05/30/2024 26 21 - 32 mmol/L Final       BMP:    Sodium   Date Value Ref Range Status   05/30/2024 138 136 - 145 mmol/L Final     Potassium   Date Value Ref Range Status   05/30/2024 4.1 3.5 - 5.1 mmol/L Final     Chloride   Date Value Ref Range Status   05/30/2024 102 99 - 110 mmol/L Final     CO2   Date Value Ref Range Status   05/30/2024 26 21 - 32 mmol/L Final     BUN   Date Value Ref Range Status   05/30/2024 11 7 - 20 mg/dL Final     Creatinine   Date Value Ref Range Status   05/30/2024

## 2024-07-18 ENCOUNTER — OFFICE VISIT (OUTPATIENT)
Dept: FAMILY MEDICINE CLINIC | Age: 44
End: 2024-07-18
Payer: COMMERCIAL

## 2024-07-18 VITALS
HEIGHT: 64 IN | HEART RATE: 72 BPM | SYSTOLIC BLOOD PRESSURE: 116 MMHG | RESPIRATION RATE: 16 BRPM | TEMPERATURE: 98.6 F | OXYGEN SATURATION: 98 % | BODY MASS INDEX: 29.87 KG/M2 | DIASTOLIC BLOOD PRESSURE: 82 MMHG

## 2024-07-18 DIAGNOSIS — R39.9 UTI SYMPTOMS: Primary | ICD-10-CM

## 2024-07-18 LAB
BILIRUBIN, POC: NEGATIVE
BLOOD URINE, POC: NEGATIVE
CLARITY, POC: CLEAR
COLOR, POC: ABNORMAL
GLUCOSE URINE, POC: NEGATIVE
KETONES, POC: ABNORMAL
LEUKOCYTE EST, POC: NEGATIVE
NITRITE, POC: NEGATIVE
PH, POC: 5.5
PROTEIN, POC: NEGATIVE
SPECIFIC GRAVITY, POC: 1.02
UROBILINOGEN, POC: ABNORMAL

## 2024-07-18 PROCEDURE — 81002 URINALYSIS NONAUTO W/O SCOPE: CPT | Performed by: NURSE PRACTITIONER

## 2024-07-18 PROCEDURE — 3074F SYST BP LT 130 MM HG: CPT | Performed by: NURSE PRACTITIONER

## 2024-07-18 PROCEDURE — 99213 OFFICE O/P EST LOW 20 MIN: CPT | Performed by: NURSE PRACTITIONER

## 2024-07-18 PROCEDURE — 3079F DIAST BP 80-89 MM HG: CPT | Performed by: NURSE PRACTITIONER

## 2024-07-18 RX ORDER — NITROFURANTOIN 25; 75 MG/1; MG/1
100 CAPSULE ORAL 2 TIMES DAILY
Qty: 20 CAPSULE | Refills: 0 | Status: SHIPPED | OUTPATIENT
Start: 2024-07-18 | End: 2024-07-28

## 2024-07-18 ASSESSMENT — ENCOUNTER SYMPTOMS
RHINORRHEA: 0
NAUSEA: 0
SORE THROAT: 0
SINUS PAIN: 0
DIARRHEA: 0
WHEEZING: 0
CHEST TIGHTNESS: 0
BACK PAIN: 1
COUGH: 0
SINUS PRESSURE: 0
VOMITING: 0
SHORTNESS OF BREATH: 0

## 2024-07-18 NOTE — PROGRESS NOTES
Kaylie Masters   44 y.o.  female  [id number]      Chief Complaint   Patient presents with    Urinary Tract Infection        Subjective:  44 y.o.female is here for a follow up. She has the following chronic/acute medical problems:  Patient Active Problem List   Diagnosis    Migraine equivalent syndrome    Hypertension    Obesity (BMI 30.0-34.9)    Hyperglycemia    Left bundle branch block       Urinary Tract Infection  This is a new problem. The current episode started 1 to 4 weeks ago (1 week ago). The problem is unchanged. Pertinent negatives include no hematuria. (Dysuria/frequency/urgency/foul urine odor)       Review of Systems   Constitutional:  Negative for appetite change, chills, fatigue and fever.   HENT:  Negative for congestion, ear pain, postnasal drip, rhinorrhea, sinus pressure, sinus pain, sneezing and sore throat.    Respiratory:  Negative for cough, chest tightness, shortness of breath and wheezing.    Cardiovascular:  Negative for chest pain and palpitations.   Gastrointestinal:  Negative for diarrhea, nausea and vomiting.   Genitourinary:  Positive for dysuria, frequency and urgency. Negative for hematuria.   Musculoskeletal:  Positive for back pain.   Skin:  Negative for rash.   Neurological:  Negative for dizziness, light-headedness and headaches.       Current Outpatient Medications   Medication Sig Dispense Refill    nitrofurantoin, macrocrystal-monohydrate, (MACROBID) 100 MG capsule Take 1 capsule by mouth 2 times daily for 10 days 20 capsule 0    oxyBUTYnin (DITROPAN-XL) 10 MG extended release tablet Take 1 tablet by mouth once daily 30 tablet 11    estradiol (ESTRACE) 0.5 MG tablet Take 1 tablet by mouth once daily 30 tablet 11    montelukast (SINGULAIR) 10 MG tablet Take 1 tablet by mouth nightly 30 tablet 12    SUMAtriptan (IMITREX) 100 MG tablet Take 1 tablet by mouth as needed for Migraine 9 tablet 5    ondansetron (ZOFRAN-ODT) 4 MG disintegrating tablet Take 1 tablet by mouth 3 times

## 2024-07-19 LAB — BACTERIA UR CULT: NORMAL

## 2024-10-04 DIAGNOSIS — I10 PRIMARY HYPERTENSION: ICD-10-CM

## 2024-10-04 RX ORDER — METOPROLOL SUCCINATE 50 MG/1
TABLET, EXTENDED RELEASE ORAL
Qty: 90 TABLET | Refills: 1 | Status: SHIPPED | OUTPATIENT
Start: 2024-10-04

## 2024-11-20 DIAGNOSIS — R10.31 RLQ ABDOMINAL PAIN: ICD-10-CM

## 2025-02-15 DIAGNOSIS — R10.31 RLQ ABDOMINAL PAIN: ICD-10-CM

## 2025-02-17 RX ORDER — OMEPRAZOLE 20 MG/1
CAPSULE, DELAYED RELEASE ORAL
Qty: 90 CAPSULE | Refills: 3 | Status: SHIPPED | OUTPATIENT
Start: 2025-02-17

## 2025-04-07 DIAGNOSIS — I10 PRIMARY HYPERTENSION: ICD-10-CM

## 2025-04-07 RX ORDER — METOPROLOL SUCCINATE 50 MG/1
50 TABLET, EXTENDED RELEASE ORAL DAILY
Qty: 90 TABLET | Refills: 1 | Status: SHIPPED | OUTPATIENT
Start: 2025-04-07

## 2025-06-12 DIAGNOSIS — N95.1 MENOPAUSAL HOT FLUSHES: ICD-10-CM

## 2025-06-12 RX ORDER — ESTRADIOL 0.5 MG/1
0.5 TABLET ORAL DAILY
Qty: 30 TABLET | Refills: 5 | Status: SHIPPED | OUTPATIENT
Start: 2025-06-12

## 2025-06-12 RX ORDER — OXYBUTYNIN CHLORIDE 10 MG/1
10 TABLET, EXTENDED RELEASE ORAL DAILY
Qty: 30 TABLET | Refills: 5 | Status: SHIPPED | OUTPATIENT
Start: 2025-06-12

## 2025-07-16 NOTE — PROGRESS NOTES
3/23/22  Jim Spears  1980    FLU/COVID-19 CLINIC EVALUATION    HPI SYMPTOMS:    Employer: [employer]    [] Fevers  [] Chills  [x] Cough  [] Coughing up blood  [x] Chest Congestion  [x] Nasal Congestion  [] Feeling short of breath  [] Sometimes  [] Frequently  [] All the time  [x] Chest tightness  [] Headaches  []Tolerable  [] Severe  [x] Sore throat  [] Muscle aches  [] Nausea  [] Vomiting  []Unable to keep fluids down  [] Diarrhea  []Severe    [x] OTHER SYMPTOMS: Fatigue and Laryngitis     Symptom Duration:   [] 1  [x] 2   [] 3   [] 4    [] 5   [] 6   [] 7   [] 8   [] 9   [] 10   [] 11   [] 12   [] 13   [] 14   [] Longer than 14 days    Symptom course:   [x] Worsening     [] Stable     [] Improving    RISK FACTORS:    [] Pregnant or possibly pregnant  [] Age over 61  [] Diabetes  [] Heart disease  [] Asthma  [] COPD/Other chronic lung diseases  [] Active Cancer  [] On Chemotherapy  [] Taking oral steroids  [] History Lymphoma/Leukemia  [] Close contact with a lab confirmed COVID-19 patient within 14 days of symptom onset  [] History of travel from affected geographical areas within 14 days of symptom onset       VITALS:  There were no vitals filed for this visit. TESTS:    POCT FLU:  [] Positive     []Negative    ASSESSMENT:    [] Flu  [] Possible COVID-19  [] Strep    PLAN:    [] Discharge home with written instructions for:  [] Flu management  [] Possible COVID-19 infection with self-quarantine and management of symptoms  [] Follow-up with primary care physician or emergency department if worsens  [] Evaluation per physician/NP/PA in clinic  [] Sent to ER       An  electronic signature was used to authenticate this note.      --Enma Moncada LPN on 4/47/2872 at 66:48 PM [facility]ist Internal Medicine Progress Note      Subjective:    No CP, SOB, abd pain, n/v.  Denies dysuria today  Tmax 101.7, wbv 9<-10, on zosyn, DI following  LFTs downttrending  UA completed on 7/15 - w/ large leuks, few bacteria, follow culture    Obj        Intake/Output Summary (Last 24 hours) at 7/16/2025 1007  Last data filed at 7/16/2025 1000  Gross per 24 hour   Intake 1640 ml   Output 1000 ml   Net 640 ml        No data recorded  No data recorded          Vital Last Value 24 Hour Range   Temperature 98.8 °F (37.1 °C) (07/16/25 0815) Temp  Min: 98.8 °F (37.1 °C)  Max: 101.7 °F (38.7 °C)   Pulse (!) 104 (07/16/25 0757) Pulse  Min: 95  Max: 104   Respiratory 18 (07/16/25 0757) Resp  Min: 16  Max: 18   Non-Invasive  Blood Pressure 131/85 (07/16/25 0757) BP  Min: 106/69  Max: 132/87   Pulse Oximetry 98 % (07/16/25 0757) SpO2  Min: 92 %  Max: 98 %   Arterial   Blood Pressure 110/65 (06/24/25 1840) No data recorded         GEN: Sitting up comfortably in bed, no acute distress  HENT: Normocephalic atraumatic, external ear normal  EYES: EOMI, no scleral icterus or conjuctival erythema  NECK: Supple, no ttp  RESPIRATORY: CTA anteriorly, equal chest rise, no wheezing  CARDIOVASCULAR: S1S2 nml, slightly tachy  GASTROINTESTINAL: Soft, no TTP, ND normal BS. no guarding. rigidity or RT  MUSCULOSKELETAL: limited, +contractures in hands  NEUROLOGIC: Non-focal, alert and oriented to person, place and time   PSYCHIATRIC: Cooperative with appropriate mood and affect    Hospital Meds  Prior to Admission medications    Medication Sig Start Date End Date Taking? Authorizing Provider   HYDROcodone-acetaminophen (NORCO) 7.5-325 MG per tablet Take 1 tablet by mouth every 12 hours as needed for Pain. 6/15/25  Yes Provider, Outside   cyanocobalamin 250 MCG tablet Take 1 tablet by mouth daily. 6/12/25  Yes Delvin Contreras MD   meclizine (ANTIVERT) 25 MG tablet Take 1 tablet by mouth every 8 hours as needed for Dizziness.  6/12/25  Yes Delvin Contreras MD   pantoprazole (PROTONIX) 40 MG tablet Take 1 tablet by mouth daily. 6/12/25  Yes Delvin Contreras MD   folic acid (FOLATE) 1 MG tablet Take 1 tablet by mouth daily. 6/13/25  Yes Delvin Contreras MD   albuterol 108 (90 Base) MCG/ACT inhaler Inhale 2 puffs into the lungs Every 6 hours as needed for Shortness of Breath. 6/12/25   Delvin Contreras MD       Current Facility-Administered Medications   Medication Dose Route Frequency Provider Last Rate Last Admin    furosemide (LASIX INJECT) injection 40 mg  40 mg Intravenous Daily Carmencita Stock MD   40 mg at 07/16/25 0817    polyethylene glycol (MIRALAX) packet 17 g  17 g Oral Daily Romie Turner MD   17 g at 07/16/25 0818    pantoprazole (PROTONIX) 40 MG/20ML (compounded) suspension 40 mg  40 mg Per PEG Tube Nightly Romie Turner MD        docusate sodium-sennosides (SENOKOT S) 50-8.6 MG 1 tablet  1 tablet Per PEG Tube BID Romie Turner MD   1 tablet at 07/16/25 0817    acetylcysteine (MUCOMYST) 20 % nebulizer solution 400 mg  400 mg Nebulization Q6H WA Resp Carmencita Stock MD   400 mg at 07/16/25 0710    albuterol (VENTOLIN) nebulizer 2.5 mg  2.5 mg Nebulization Q6H Resp Carmencita Stock MD   2.5 mg at 07/16/25 0710    enoxaparin (LOVENOX) injection 105 mg  1 mg/kg Subcutaneous Q12H Beba Suero DO   105 mg at 07/16/25 0818    pyridoxine (VITAMIN B6) tablet 50 mg  50 mg Per PEG Tube Daily Beba Suero DO   50 mg at 07/16/25 0825    piperacillin-tazobactam (ZOSYN) 3.375 g in sodium chloride 0.9 % 100 mL IVPB  3.375 g Intravenous 3 times per day Beba Suero DO 25 mL/hr at 07/16/25 0520 3.375 g at 07/16/25 0520    Phosphorus Standard Replacement Protocol   Does not apply See Admin Instructions Malini Garber MD        Magnesium Standard Replacement Protocol   Does not apply See Admin Instructions Malini Garber MD        Potassium Standard Replacement Protocol (Levels 3.5 and lower)    Does not apply See Admin Instructions Malini Garber MD        Potassium Replacement (Levels 3.6 - 4)   Does not apply See Admin Instructions Malini Garber MD        cyanocobalamin (Vitamin B-12) tablet 250 mcg  250 mcg Per NG Tube Daily Malini Garber MD   250 mcg at 07/16/25 0817    PRENATAL vitamin & mineral w/ folic acid 1 mg tablet 1 tablet  1 tablet Per NG Tube Daily Malini Garber MD   1 tablet at 07/16/25 0825    zinc sulfate (ZINCATE) capsule 220 mg  220 mg Per NG Tube Daily with breakfast Malini Garber MD   220 mg at 07/16/25 0817    thiamine (VITAMIN B1) tablet 100 mg  100 mg Per NG Tube TID Anabel Palm MD   100 mg at 07/16/25 0818    Followed by    [START ON 7/18/2025] thiamine (VITAMIN B1) tablet 100 mg  100 mg Per NG Tube Daily Anabel Palm MD        sodium chloride 0.9 % injection 2 mL  2 mL Intracatheter 2 times per day Andrew Henderson DO   2 mL at 07/16/25 0818       Current Facility-Administered Medications   Medication Dose Route Frequency Provider Last Rate Last Admin    sodium chloride 0.9% infusion   Intravenous Continuous PRN Andrew Henderson DO           Current Facility-Administered Medications   Medication Dose Route Frequency Provider Last Rate Last Admin    guaiFENesin-DM (ROBITUSSIN DM) 100-10 MG/5ML syrup 10 mL  10 mL Per PEG Tube Q4H PRN Beba Suero DO        albuterol inhaler 2 puff  2 puff Inhalation Q6H Resp PRN Beba Suero DO        ipratropium-albuterol (DUONEB) 0.5-2.5 (3) MG/3ML nebulizer solution 3 mL  3 mL Nebulization Q4H Resp PRN Swetha Webber MD   3 mL at 07/14/25 0011    acetaminophen (TYLENOL) tablet 650 mg  650 mg Per NG Tube Q4H PRN Beba Suero DO   650 mg at 07/16/25 0816    polyethylene glycol (MIRALAX) packet 17 g  17 g Per NG Tube Daily PRN Malini Garber MD        ondansetron (ZOFRAN) injection 4 mg  4 mg Intravenous Q6H PRN Pari Lebron MD   4 mg at 06/29/25 0831    HYDROcodone-acetaminophen (NORCO)  5-325 MG per tablet 1 tablet  1 tablet Per NG Tube Q4H PRN Anabel Palm MD   1 tablet at 07/15/25 0902    sodium chloride 0.9 % injection 10 mL  10 mL Intravenous PRN Andrew Henderson DO        sodium chloride 0.9% infusion   Intravenous Continuous PRN Andrew Henderson DO            Labs     Recent Labs     07/15/25  0414 07/15/25  1040 07/16/25  0727   WBC 9.8 10.6 9.3   RBC 2.10* 2.26* 2.30*   HGB 7.6* 8.2* 8.3*   HCT 24.7* 27.4* 27.6*    222 252   .6* 121.2* 120.0*   MCH 36.2* 36.3* 36.1*   MCHC 30.8* 29.9* 30.1*   NRBCRE 0 0 0         Recent Labs     07/15/25  0414 07/15/25  1040 07/16/25  0430   SODIUM 139 137 135   POTASSIUM 4.0 4.6 4.0   MG  --   --  2.1   CO2 30 27 29   ANIONGAP 9 10 7   GLUCOSE 118* 118* 113*   BUN 17 16 16   CREATININE 0.39* 0.40* 0.47*   CALCIUM 8.5 8.3* 8.0*   BILIRUBIN 0.9 1.0 1.0   AST 97* 116* 77*   GPT 63 65* 55   ALKPT 438* 440* 409*   GLOB 4.1* 4.5*  --    AGR 0.4* 0.3*  --         Recent Labs     07/15/25  0414   PCT 0.24*        Recent Labs   Lab 07/13/25  0527   NTPROB 7,463*        Recent Labs     07/15/25  1153   INR 1.1   PT 11.9*   PTT 35*       Recent Labs   Lab 07/15/25  1123 07/15/25  2353 07/16/25  0606   GLUCOSE BEDSIDE 99 108* 115*       Cultures  Microbiology Results  (Last 10 results in the past 7 days)      Specimen   Gram Smear   Culture Result   Status       07/15/25  1409         Inadequate quality specimen. Heavy oral contamination. No culture performed. Recommend recollection if clinically indicated.                     Assessment/Plan:  61-year-old female with multiple medical problems including morbid obesity, prior gastric sleeve in 2017, CERVANTES, generalized anxiety disorder, chronic bilateral UPJ obstruction, significant mobility decline over the last 6 months secondary to severe DJD where she has had a prior TKA and recent left TKA along with distal femur periprosthetic fracture with surgery and January of this year and then another prolonged  hospitalization due to dysphagia from Schatzki's ring which has left her in a state of chronic malnutrition and poor p.o. intake, this is continued since patient left the hospital on June 12, refused rehab, has been at home, family states that care has been very difficult at home and patient has had very minimal p.o. intake since discharge. Acutely today came into the ED with slurred speech and facial droop along with concern for seizure activity that was characterized as a rhythmic motion but with no reported postictal component.      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 No overt motor polyneuropathy or myopathy noted. Unable to rule out sensory neuropathy as patient was unable to tolerate the study.   - Repeat in 4-6 weeks  - Neurology signed off  - Encephalopathy improving overall      Sepsis, concern for ?aspiration PNA   Volume overload, pulmonary edema     - 7/12 pm: febrile to 101.8, tachycardic to 105  - blood cultures drawn, NGTD  - CXR ordered --> with evidence of congestion and increased secretions, possibly underlying infection   - proBNP also elevated to 7k on 7/13   - s/p lasix IV dpse, monitor stricts ins/outs --> repeat doses as needed,- started on IV lasix daily on 7/15 per pulm  - airway clearance therapy with RT, pulmonary hygiene, duo nebs/robitussin, frequent suctioning   - empirically start zosyn for possible pna/aspiration component as pt with noted secretions  --> repeat CXR on 7/14 with basilar lung opacities L > R   - CT Chest on 7/14 -  Patchy bilateral perihilar consolidations and groundglass opacities, may represent infection, inflammation, and/or pulmonary edema.  -  pulm following, appreciate recommendations   - covid/flu/rsv negative   - tunneled line removed 7/14   - 7/15 - UA completed on 7/15 - w/ large leuks, few bacteria, follow culture  - RUE duplex ordered  - ID consulted as continues to have fevers     Lactic acidosis of unclear etiology; could be multifactorial related to starvation ketosis, nutritional deficiencies especially Thiamine deficiencey, less likely seizures. - imiproved  Thiamine and other cofactor deficiency   -Cause being malnutrition not eating  -Metabolic encephalopathy improving   -Patient received high doses of thiamine x 6 days followed by 250 mg daily  and will be placed on 100 mg daily   -Patient was also started on  multivitamins which also would be continued indefinitely including copper zinc  -s/p TPN via picc line due to lack of adequate oral intake --> dietician recommends continued TPN until TF at goal   - 7/12 - TPN discontinued, order placed to remove tunneled line, removed 7/14   - continue TF per dietician      Large and small bowel ileus, improved   H/o gastric sleeve  Dysphagia  Schatzkis ring  -Patient's feedings were placed on hold due to apparent ileus vs partial small bowel obstruction; NG was on LIS, now off  -CT A/P 6/29: Limited unenhanced and artifactual exam. Abnormal dilatation of loops of small and large bowel with air-fluid levels. No clear focal transition point. Findings may be related to ileus or partial bowel obstruction.  -KUB 6/30/25 - Multiple dilated loops of small bowel and colon, similar to prior. Differential diagnosis includes ileus versus incomplete small bowel obstruction.   - GI on consult  - PPN/TPN started   - Capping trial passed  - Started trickle feed and slowly increased to goal which pt tolerated well  - having BM, no nausea/vomiting   - Banatrol for liquid stool, which has improved   - 7/9 - s/p PEG, TF initiated, advance slowly as able. Abdominal exam benign, having BM, no nausea/vomiting   - 7/12 -  pt tolerating TF at goal with no nausea, vomiting, pain, having BM      Acute on chronic anemia   - likely iatrogenic from blood draws/procedure, with component nutritional deficiencies  - 1u pRBC ordered for anemia on 7/10   - vitamins as above   - no s/s bleeding, since has been stable   - CTM      Acute hypoxic respiratory failure, resolved   [place] in hospitalization   -CXR w/o obv acute findings  -Pt initially started on HFNC due to intermittent desaturations  -Pulm consulted  -Resp therapy for chest PT  -Cont suction for upper airway secretions prn   - now on RA     Hypotension, resolved   -Intermittent and possibly related to cuff  -Midodrine started by pulm/cc - discontinued 7/8, BP remains stable     Elevated LFTs  CERVANTES  Cholelithiasis  Jaundice w both direct and indirect hyperbilirubenmia  -Monitor LFTS   - bili improving      Lower extremity pitting edema  Upper extremity edema   - Gentle ace wraps on feet up to knees  - likely with large component of dependent edema as pt poorly mobile   - lasix started 7/13 as noted above, redose as needed   - 7/14: LUE>RUE edema, will obtain venous duplex. Possible DVT is source of fevers as well      Dysarthria  Psychogenic dysphagia/anorexia  - seems nutritional deficiencies, MRI neg  - previously in hospital stay speech therapy did video swallow, patient would not open mouth they really had to put water in her mouth and then she swallowed there was no aspiration seen on the video swallow, but patient does intermittently have behavioral issues as to follow directions or be able to move even her mouth to open or move her arms   - neurology and patient's daughter Kaley has been informed  - Follow-up on myasthenia antibody panel -- negative   - Continue PT/OT, speech and swallow evaluation, cleared for full liquid diet by speech though pt refusing   - GI for PEG as noted above      Chronic B/l UPJ obstruction  UTI  - i, pt has now completed course     Debility  -  related to severe nutritional/vitamin deficiencies, obesity and recent TKA and femur fx sp ORIF     MDD  THAO  - not on meds at home  - EKG on 6/22 - has prolonged qtc, repeat EKG shows improvement in her qts to 458          DVT PPX   Current Active Medications for DVT Prophylaxis (From admission, onward)           Stop     enoxaparin (LOVENOX) injection 105 mg  1 mg/kg,   Subcutaneous,   EVERY 12 HOURS         --                    Dietary Orders (From admission, onward)       Start     Ordered    07/11/25 1333  Nutrition Communication  CONTINUOUS        Question:  Nutrition communication (specify)  Answer:  Patient prefers chicken broth    07/11/25 1332    07/11/25 0929  Tube Feeding Diet Osmolite 1.2 Calorie/1.2 Calorie Formula - No Fiber; Plus Diet Tray; Liquid Full; Yes, Medical Nutrition Management by RD (Registered Dietitian); Water; Before and after medications; 40  DIET EFFECTIVE NOW        References:    [facility] Food and Nutrition Services Medical Nutrition Therapy   Question Answer Comment   Tube Feeding Products Osmolite 1.2 Calorie/1.2 Calorie Formula - No Fiber    Administer enteral feeding. Choose schedule Continuous    Method By pump    Initiate enteral feeding at (mL/hr) 20    Increase feeding by (mL/hr) as tolerated until goal is reached 10 mL/hr every 8 hours    Advance to goal rate (mL/hr) 60    Tube Feeding Relationship Plus Diet Tray    Diet Modifiers Liquid Full    Medical Nutrition Management by RD (Registered Dietitian) Yes, Medical Nutrition Management by RD (Registered Dietitian)    Flush with Water    Flush frequency Before and after medications    Flush volume (mL) 40        07/11/25 0928    07/06/25 1303  Nutrition Communication  CONTINUOUS        Question:  Nutrition communication (specify)  Answer:  please send chicken broth with all meals. thanks!    07/06/25 1304    07/05/25 1201  Banatrol TF/Fiber Modular; 1; Other (specify); daily Tube Feeding Nutrition Supplement - Send with  tube feeding  As Directed        Question Answer Comment   Tube Feeding Supplement Banatrol TF/Fiber Modular    Quantity of Supplement per Day (specify) 1    Frequency Other (specify)    Other (specify) daily        07/05/25 1201                    Primary Care Physician  Emi Lopez MD    Code Status    Code Status: Selective Treatment/DNR    Romie Turner MD  [facility] [place] Hospitalist  7/16/2025